# Patient Record
Sex: MALE | Race: BLACK OR AFRICAN AMERICAN | NOT HISPANIC OR LATINO | URBAN - METROPOLITAN AREA
[De-identification: names, ages, dates, MRNs, and addresses within clinical notes are randomized per-mention and may not be internally consistent; named-entity substitution may affect disease eponyms.]

---

## 2017-12-22 ENCOUNTER — EMERGENCY (EMERGENCY)
Facility: HOSPITAL | Age: 42
LOS: 1 days | Discharge: HOME | End: 2017-12-22
Attending: EMERGENCY MEDICINE | Admitting: EMERGENCY MEDICINE
Payer: SELF-PAY

## 2017-12-22 VITALS
OXYGEN SATURATION: 94 % | DIASTOLIC BLOOD PRESSURE: 76 MMHG | SYSTOLIC BLOOD PRESSURE: 130 MMHG | HEART RATE: 101 BPM | TEMPERATURE: 99 F | RESPIRATION RATE: 18 BRPM

## 2017-12-22 VITALS
SYSTOLIC BLOOD PRESSURE: 110 MMHG | DIASTOLIC BLOOD PRESSURE: 80 MMHG | RESPIRATION RATE: 14 BRPM | OXYGEN SATURATION: 99 % | HEART RATE: 88 BPM

## 2017-12-22 DIAGNOSIS — R55 SYNCOPE AND COLLAPSE: ICD-10-CM

## 2017-12-22 LAB
ALBUMIN SERPL ELPH-MCNC: 3.9 G/DL — SIGNIFICANT CHANGE UP (ref 3.4–5)
ALP SERPL-CCNC: 97 U/L — SIGNIFICANT CHANGE UP (ref 40–120)
ALT FLD-CCNC: 34 U/L — SIGNIFICANT CHANGE UP (ref 12–42)
ANION GAP SERPL CALC-SCNC: 7 MMOL/L — LOW (ref 9–16)
APPEARANCE UR: CLEAR — SIGNIFICANT CHANGE UP
AST SERPL-CCNC: 24 U/L — SIGNIFICANT CHANGE UP (ref 15–37)
BILIRUB SERPL-MCNC: 0.1 MG/DL — LOW (ref 0.2–1.2)
BILIRUB UR-MCNC: NEGATIVE — SIGNIFICANT CHANGE UP
BUN SERPL-MCNC: 23 MG/DL — SIGNIFICANT CHANGE UP (ref 7–23)
CALCIUM SERPL-MCNC: 8.9 MG/DL — SIGNIFICANT CHANGE UP (ref 8.5–10.5)
CHLORIDE SERPL-SCNC: 104 MMOL/L — SIGNIFICANT CHANGE UP (ref 96–108)
CO2 SERPL-SCNC: 28 MMOL/L — SIGNIFICANT CHANGE UP (ref 22–31)
COLOR SPEC: YELLOW — SIGNIFICANT CHANGE UP
CREAT SERPL-MCNC: 1.67 MG/DL — HIGH (ref 0.5–1.3)
DIFF PNL FLD: NEGATIVE — SIGNIFICANT CHANGE UP
GLUCOSE SERPL-MCNC: 166 MG/DL — HIGH (ref 70–99)
GLUCOSE UR QL: NEGATIVE — SIGNIFICANT CHANGE UP
HCT VFR BLD CALC: 42.2 % — SIGNIFICANT CHANGE UP (ref 39–50)
HGB BLD-MCNC: 14.3 G/DL — SIGNIFICANT CHANGE UP (ref 13–17)
KETONES UR-MCNC: (no result) MG/DL
LEUKOCYTE ESTERASE UR-ACNC: NEGATIVE — SIGNIFICANT CHANGE UP
MAGNESIUM SERPL-MCNC: 1.8 MG/DL — SIGNIFICANT CHANGE UP (ref 1.6–2.6)
MCHC RBC-ENTMCNC: 31.4 PG — SIGNIFICANT CHANGE UP (ref 27–34)
MCHC RBC-ENTMCNC: 33.9 G/DL — SIGNIFICANT CHANGE UP (ref 32–36)
MCV RBC AUTO: 92.5 FL — SIGNIFICANT CHANGE UP (ref 80–100)
NITRITE UR-MCNC: NEGATIVE — SIGNIFICANT CHANGE UP
PH UR: 6 — SIGNIFICANT CHANGE UP (ref 5–8)
PLATELET # BLD AUTO: 250 K/UL — SIGNIFICANT CHANGE UP (ref 150–400)
POTASSIUM SERPL-MCNC: 3.8 MMOL/L — SIGNIFICANT CHANGE UP (ref 3.5–5.3)
POTASSIUM SERPL-SCNC: 3.8 MMOL/L — SIGNIFICANT CHANGE UP (ref 3.5–5.3)
PROT SERPL-MCNC: 7.5 G/DL — SIGNIFICANT CHANGE UP (ref 6.4–8.2)
PROT UR-MCNC: NEGATIVE MG/DL — SIGNIFICANT CHANGE UP
RBC # BLD: 4.56 M/UL — SIGNIFICANT CHANGE UP (ref 4.2–5.8)
RBC # FLD: 12 % — SIGNIFICANT CHANGE UP (ref 10.3–16.9)
SODIUM SERPL-SCNC: 139 MMOL/L — SIGNIFICANT CHANGE UP (ref 132–145)
SP GR SPEC: >=1.03 — SIGNIFICANT CHANGE UP (ref 1–1.03)
UROBILINOGEN FLD QL: 0.2 E.U./DL — SIGNIFICANT CHANGE UP
WBC # BLD: 6.4 K/UL — SIGNIFICANT CHANGE UP (ref 3.8–10.5)
WBC # FLD AUTO: 6.4 K/UL — SIGNIFICANT CHANGE UP (ref 3.8–10.5)

## 2017-12-22 PROCEDURE — 99284 EMERGENCY DEPT VISIT MOD MDM: CPT | Mod: 25

## 2017-12-22 PROCEDURE — 93010 ELECTROCARDIOGRAM REPORT: CPT

## 2017-12-22 RX ORDER — SODIUM CHLORIDE 9 MG/ML
1000 INJECTION INTRAMUSCULAR; INTRAVENOUS; SUBCUTANEOUS
Qty: 0 | Refills: 0 | Status: DISCONTINUED | OUTPATIENT
Start: 2017-12-22 | End: 2017-12-26

## 2017-12-22 RX ADMIN — SODIUM CHLORIDE 200 MILLILITER(S): 9 INJECTION INTRAMUSCULAR; INTRAVENOUS; SUBCUTANEOUS at 20:37

## 2017-12-22 NOTE — ED PROVIDER NOTE - MEDICAL DECISION MAKING DETAILS
syncope vs unlikely seizure.  Patient without sig PMH, denies toxic habits.  Denies premonitory symptoms prior to incident - states "I felt fine today".  No VS derangements.  NO signs of head or neck trauma.  No loud murmurs, non focal neurological exam.  EKG, labs, fluids.

## 2017-12-22 NOTE — ED PROVIDER NOTE - NEUROLOGICAL, MLM
Alert and oriented, no focal deficits, no motor or sensory deficits.  clear and coherent speech, normal cranial nerve exam, normal finger to nose and SHANI.  Steady gait.  No pronator drift.

## 2017-12-22 NOTE — ED ADULT NURSE REASSESSMENT NOTE - NS ED NURSE REASSESS COMMENT FT1
assumed care of patient from RN [place]; pt here with seizure vs syncope episode; fludis running; iv patent; will continue to monitor

## 2017-12-22 NOTE — ED PROVIDER NOTE - OBJECTIVE STATEMENT
P Patient states I passed out.  As per PCR - patient found laying on the ground.  GF states that he passed out and was shaking for about 30 seconds.  AAO x 3, EMS states that he was postictal.  Transported without incident.  Patient denies premonitory symptoms.  Traveled from [place] by bus today.  No PMH, denies toxic habits.  GF is not at the bedside a this time.  seen walking around the ED prior to evaluation.

## 2017-12-22 NOTE — ED PROVIDER NOTE - PROGRESS NOTE DETAILS
GF now at the bedside.  Does not describe seizure like activity.  Shaking likely from monoclonic jerks.  EKG nsr.  Elevated creatinine.  discussed in detail with patient.  Lives in [place] and will follow up with his PMD although he has not sought medical care in years.  Unchanged neurological exam.  Conservative management discussed with the patient in detail.  Close PMD follow up encouraged.  Strict ED return instructions discussed in detail and patient given the opportunity to ask any questions about their discharge diagnosis and instructions

## 2017-12-22 NOTE — ED ADULT TRIAGE NOTE - CHIEF COMPLAINT QUOTE
new onset seizure 20 minutes ago while out to eat with his girlfriend. pt denies hx of seizure. as per GF seizure lasted a minute. denies drugs. awake alert oriented x 3 with headache

## 2017-12-22 NOTE — ED ADULT NURSE NOTE - OBJECTIVE STATEMENT
chronic shoulder pain. patient awake and ambulating independently. witness states that patient collapsed and then began to twitch. pt states that he does not recall the event. resting in bed, no signs of distress. family at bedside and supportive of care.

## 2018-12-21 ENCOUNTER — HOSPITAL ENCOUNTER (EMERGENCY)
Facility: HOSPITAL | Age: 43
Discharge: HOME | End: 2018-12-21
Attending: EMERGENCY MEDICINE | Admitting: EMERGENCY MEDICINE

## 2018-12-21 ENCOUNTER — APPOINTMENT (EMERGENCY)
Dept: RADIOLOGY | Facility: HOSPITAL | Age: 43
End: 2018-12-21
Attending: EMERGENCY MEDICINE

## 2018-12-21 VITALS
BODY MASS INDEX: 35.92 KG/M2 | OXYGEN SATURATION: 99 % | SYSTOLIC BLOOD PRESSURE: 100 MMHG | RESPIRATION RATE: 16 BRPM | HEIGHT: 68 IN | DIASTOLIC BLOOD PRESSURE: 60 MMHG | HEART RATE: 90 BPM | WEIGHT: 237 LBS | TEMPERATURE: 98 F

## 2018-12-21 DIAGNOSIS — H60.12 CELLULITIS OF AURICLE OF LEFT EAR: ICD-10-CM

## 2018-12-21 DIAGNOSIS — H60.312 ACUTE DIFFUSE OTITIS EXTERNA OF LEFT EAR: Primary | ICD-10-CM

## 2018-12-21 LAB
ANION GAP SERPL CALC-SCNC: 8 MEQ/L (ref 3–15)
BASOPHILS # BLD: 0.03 K/UL (ref 0.01–0.1)
BASOPHILS NFR BLD: 0.4 %
BUN SERPL-MCNC: 20 MG/DL (ref 8–20)
CALCIUM SERPL-MCNC: 9 MG/DL (ref 8.9–10.3)
CHLORIDE SERPL-SCNC: 103 MEQ/L (ref 98–109)
CO2 SERPL-SCNC: 26 MEQ/L (ref 22–32)
CREAT SERPL-MCNC: 1.2 MG/DL
DIFFERENTIAL METHOD BLD: ABNORMAL
EOSINOPHIL # BLD: 0.12 K/UL (ref 0.04–0.54)
EOSINOPHIL NFR BLD: 1.7 %
ERYTHROCYTE [DISTWIDTH] IN BLOOD BY AUTOMATED COUNT: 12.5 % (ref 11.6–14.4)
GFR SERPL CREATININE-BSD FRML MDRD: >60 ML/MIN/1.73M*2
GLUCOSE SERPL-MCNC: 112 MG/DL (ref 70–99)
HCT VFR BLDCO AUTO: 45.9 %
HGB BLD-MCNC: 15.5 G/DL
IMM GRANULOCYTES # BLD AUTO: 0.02 K/UL (ref 0–0.08)
IMM GRANULOCYTES NFR BLD AUTO: 0.3 %
LYMPHOCYTES # BLD: 2.11 K/UL (ref 1.2–3.5)
LYMPHOCYTES NFR BLD: 29.7 %
MCH RBC QN AUTO: 30.9 PG (ref 28–33.2)
MCHC RBC AUTO-ENTMCNC: 33.8 G/DL (ref 32.2–36.5)
MCV RBC AUTO: 91.6 FL (ref 83–98)
MONOCYTES # BLD: 0.49 K/UL (ref 0.3–1)
MONOCYTES NFR BLD: 6.9 %
NEUTROPHILS # BLD: 4.34 K/UL (ref 1.7–7)
NEUTS SEG NFR BLD: 61 %
NRBC BLD-RTO: 0.1 %
PDW BLD AUTO: 9.2 FL (ref 9.4–12.4)
PLATELET # BLD AUTO: 274 K/UL
POTASSIUM SERPL-SCNC: 3.5 MEQ/L (ref 3.6–5.1)
RBC # BLD AUTO: 5.01 M/UL (ref 4.5–5.8)
SODIUM SERPL-SCNC: 137 MEQ/L (ref 136–144)
WBC # BLD AUTO: 7.11 K/UL

## 2018-12-21 PROCEDURE — 70481 CT ORBIT/EAR/FOSSA W/DYE: CPT

## 2018-12-21 PROCEDURE — 63700000 HC SELF-ADMINISTRABLE DRUG: Performed by: EMERGENCY MEDICINE

## 2018-12-21 PROCEDURE — 36415 COLL VENOUS BLD VENIPUNCTURE: CPT | Performed by: EMERGENCY MEDICINE

## 2018-12-21 PROCEDURE — 85025 COMPLETE CBC W/AUTO DIFF WBC: CPT | Performed by: EMERGENCY MEDICINE

## 2018-12-21 PROCEDURE — 84295 ASSAY OF SERUM SODIUM: CPT | Performed by: EMERGENCY MEDICINE

## 2018-12-21 PROCEDURE — 25800000 HC PHARMACY IV SOLUTIONS: Performed by: EMERGENCY MEDICINE

## 2018-12-21 PROCEDURE — 63600105 HC IODINE BASED CONTRAST: Performed by: EMERGENCY MEDICINE

## 2018-12-21 PROCEDURE — 99284 EMERGENCY DEPT VISIT MOD MDM: CPT | Mod: 25

## 2018-12-21 RX ORDER — OFLOXACIN 3 MG/ML
10 SOLUTION AURICULAR (OTIC) DAILY
Qty: 5 ML | Refills: 0 | Status: SHIPPED | OUTPATIENT
Start: 2018-12-21 | End: 2018-12-28

## 2018-12-21 RX ORDER — CIPROFLOXACIN 500 MG/1
500 TABLET ORAL 2 TIMES DAILY
Qty: 14 TABLET | Refills: 0 | Status: SHIPPED | OUTPATIENT
Start: 2018-12-21 | End: 2018-12-28

## 2018-12-21 RX ORDER — POTASSIUM CHLORIDE 750 MG/1
40 TABLET, FILM COATED, EXTENDED RELEASE ORAL ONCE
Status: COMPLETED | OUTPATIENT
Start: 2018-12-21 | End: 2018-12-21

## 2018-12-21 RX ADMIN — SODIUM CHLORIDE 1000 ML: 9 INJECTION, SOLUTION INTRAVENOUS at 15:50

## 2018-12-21 RX ADMIN — IOHEXOL 100 ML: 300 INJECTION, SOLUTION INTRAVENOUS at 17:53

## 2018-12-21 RX ADMIN — POTASSIUM CHLORIDE 40 MEQ: 750 TABLET, FILM COATED, EXTENDED RELEASE ORAL at 19:20

## 2018-12-21 ASSESSMENT — ENCOUNTER SYMPTOMS
NECK PAIN: 0
RHINORRHEA: 0
VOMITING: 0
CHILLS: 0
TROUBLE SWALLOWING: 0
FEVER: 0
HEADACHES: 0

## 2018-12-21 NOTE — ED ATTESTATION NOTE
Agree with Pas findings and plan.I evaluated and performed a history and physical examination of the patient.43 man presents with swelling and discomfort in his left ear.  He thinks perhaps it started with a bug bite.  He says his hearing is slightly reduced but denies fever or chills.  He is not diabetic and has no other medical problems exam: Alert man pleasant cooperative examination left ear shows diffuse swelling of the pinna is slightly tender to palpation he has minimal fluid coming out of the left ear canal.  No cellulitis or skin reaction over the mastoid process but he is definitely tender there.     Seferino Mcnair MD  12/21/18 1823

## 2018-12-21 NOTE — ED PROVIDER NOTES
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  denies injury  Relieved by:  Nothing  Worsened by:  Nothing  Ineffective treatments:  None tried  Associated symptoms: no congestion, no ear discharge, no fever, no headaches, no hearing loss, no neck pain, no rash, no rhinorrhea, no tinnitus and no vomiting    Risk factors: no chronic ear infection         Patient History     History reviewed. No pertinent past medical history.    History reviewed. No pertinent surgical history.    History reviewed. No pertinent family history.    Social History   Substance Use Topics   • Smoking status: Never Smoker   • Smokeless tobacco: Never Used   • Alcohol use No       Systems Reviewed from Nursing Triage:  Tobacco  Allergies  Meds  Problems  Med Hx  Surg Hx  Soc Hx         Review of Systems     Review of Systems   Constitutional: Negative for chills and fever.   HENT: Positive for ear pain. Negative for congestion, ear discharge, hearing loss, rhinorrhea, tinnitus and trouble swallowing.    Gastrointestinal: Negative for vomiting.   Musculoskeletal: Negative for neck pain.   Skin: Negative for rash.   Neurological: Negative for headaches.        Physical Exam     ED Triage Vitals [12/21/18 1312]   Temp Heart Rate Resp BP SpO2   36.7 °C (98 °F) 90 16 100/60 99 %      Temp Source Heart Rate Source Patient Position BP Location FiO2 (%) (Set)   Tympanic -- -- -- --                     Patient Vitals for the past 24 hrs:   BP Temp Temp src  "Pulse Resp SpO2 Height Weight   12/21/18 1312 100/60 36.7 °C (98 °F) Tympanic 90 16 99 % 1.727 m (5' 8\") 108 kg (237 lb)           Physical Exam   Constitutional: He appears well-developed and well-nourished.   HENT:   Right Ear: Hearing, tympanic membrane, external ear and ear canal normal.   Left Ear: Hearing normal. There is swelling (mild erythema and swelling of the external canal) and tenderness. There is mastoid tenderness. Tympanic membrane is not erythematous, not retracted and not bulging.   Ears:    Eyes: Conjunctivae and EOM are normal. Pupils are equal, round, and reactive to light.   Neck: Normal range of motion.   Lymphadenopathy:     He has cervical adenopathy (mild left anterior).   Nursing note and vitals reviewed.           Procedures    ED Course & MDM     Labs Reviewed   BASIC METABOLIC PANEL - Abnormal        Result Value    Sodium 137      Potassium 3.5 (*)     Chloride 103      CO2 26      BUN 20      Creatinine 1.2      Glucose 112 (*)     Calcium 9.0      eGFR >60.0      Anion Gap 8     CBC - Abnormal     WBC 7.11      RBC 5.01      Hemoglobin 15.5      Hematocrit 45.9      MCV 91.6      MCH 30.9      MCHC 33.8      RDW 12.5      Platelets 274      MPV 9.2 (*)    DIFF COUNT - Abnormal     Differential Type Auto      nRBC 0.1 (*)     Immature Granulocytes 0.3      Neutrophils 61.0      Lymphocytes 29.7      Monocytes 6.9      Eosinophils 1.7      Basophils 0.4      Immature Granulocytes, Absolute 0.02      Neutrophils, Absolute 4.34      Lymphocytes, Absolute 2.11      Monocytes, Absolute 0.49      Eosinophils, Absolute 0.12      Basophils, Absolute 0.03     CBC AND DIFFERENTIAL    Narrative:     The following orders were created for panel order CBC and differential.  Procedure                               Abnormality         Status                     ---------                               -----------         ------                     CBC[[id number]]                           Abnormal      "       Final result               Diff Count[[id number]]                    Abnormal            Final result                 Please view results for these tests on the individual orders.       CT ORBITS AND SELLA WITH IV CONTRAST   Final Result   IMPRESSION:      Left otitis externa involving the cartilaginous external auditory canal and left   pinna.               CT SOFT TISSUE NECK WITH IV CONTRAST    (Results Pending)               [facility]         ED Course as of Dec 21 2126   Fri Dec 21, 2018   1843 Call placed to ent to discuss treatment and follow up  [RS]   1920 2nd call to ENT  [RS]   1922 Spoke with dr lind agrees with flouroquinolone ear drops and oral for otitis externa.  Will see in office on Monday have pt call 9 am and they will fit him in  [RS]      ED Course User Index  [RS] Dirk Alberto PA C         Clinical Impressions as of Dec 21 2126   Acute diffuse otitis externa of left ear   Cellulitis of auricle of left ear        Dirk Alberto PA C  12/21/18 2126

## 2019-02-15 ENCOUNTER — HOSPITAL ENCOUNTER (EMERGENCY)
Facility: HOSPITAL | Age: 44
Discharge: HOME | End: 2019-02-15
Attending: EMERGENCY MEDICINE

## 2019-02-15 VITALS
WEIGHT: 237 LBS | BODY MASS INDEX: 35.92 KG/M2 | TEMPERATURE: 97.2 F | HEIGHT: 68 IN | HEART RATE: 78 BPM | DIASTOLIC BLOOD PRESSURE: 71 MMHG | SYSTOLIC BLOOD PRESSURE: 124 MMHG | RESPIRATION RATE: 16 BRPM | OXYGEN SATURATION: 99 %

## 2019-02-15 DIAGNOSIS — H60.12 CELLULITIS OF LEFT EXTERNAL EAR: Primary | ICD-10-CM

## 2019-02-15 PROCEDURE — 99282 EMERGENCY DEPT VISIT SF MDM: CPT

## 2019-02-15 RX ORDER — CEPHALEXIN 500 MG/1
500 CAPSULE ORAL 4 TIMES DAILY
Qty: 28 CAPSULE | Refills: 0 | Status: SHIPPED | OUTPATIENT
Start: 2019-02-15 | End: 2019-02-22

## 2019-02-15 ASSESSMENT — ENCOUNTER SYMPTOMS: FEVER: 0

## 2019-02-15 NOTE — ED PROVIDER NOTES
"HPI     Chief Complaint   Patient presents with   • Earache / Otalgia       Patient is a pleasant 43-year-old male with no past medical history presents the emergency department for evaluation of a swelling to the outer portion of the left ear.  Patient states his symptoms are similar to when he was seen here about 2 months ago and diagnosed with an ear infection.  Symptoms just began yesterday evening.  No known trauma.  No significant pain.  No change in hearing.  No fever, URI symptoms.  Patient states that when he was seen previously, he took about a week to get the antibiotics filled, but symptoms cleared up with the antibiotics.  He was not able to get the eardrops filled because of the cost of the medication.  He did not follow-up with ENT.             Patient History     History reviewed. No pertinent past medical history.    History reviewed. No pertinent surgical history.    History reviewed. No pertinent family history.    Social History   Substance Use Topics   • Smoking status: Never Smoker   • Smokeless tobacco: Never Used   • Alcohol use Yes      Comment: occasional       Systems Reviewed from Nursing Triage:          Review of Systems     Review of Systems   Constitutional: Negative for fever.   HENT: Negative for ear discharge.    All other systems reviewed and are negative.       Physical Exam     ED Triage Vitals [02/15/19 0825]   Temp Heart Rate Resp BP SpO2   36.2 °C (97.2 °F) 78 16 124/71 99 %      Temp src Heart Rate Source Patient Position BP Location FiO2 (%) (Set)   -- -- -- -- --                     Patient Vitals for the past 24 hrs:   BP Temp Pulse Resp SpO2 Height Weight   02/15/19 0825 124/71 36.2 °C (97.2 °F) 78 16 99 % 1.727 m (5' 8\") 108 kg (237 lb)           Physical Exam   Constitutional: He appears well-developed and well-nourished.   HENT:   Head: Normocephalic and atraumatic.   Right Ear: Tympanic membrane and ear canal normal.   Left Ear: Tympanic membrane and ear canal " normal.   Ears:    Cardiovascular: Normal rate and regular rhythm.    Pulmonary/Chest: Effort normal and breath sounds normal.   Neurological: He is alert.            Procedures    ED Course & MDM     Labs Reviewed - No data to display    No orders to display               MDM  Number of Diagnoses or Management Options  Cellulitis of left external ear:   Diagnosis management comments: Patient is a 43-year-old male who presents the emergency department for evaluation of redness of the L ear.  On exam, patient is overall very well-appearing.  TMs are normal, no signs of otitis media or otitis externa on exam.  He has no tenderness over the mastoid.  No history of trauma. Suspect cellulitis of the external ear.  Patient will be started on a course of oral antibiotics.  He is encouraged to follow-up with ENT or return to the emergency department if symptoms are not improving           Clinical Impressions as of Feb 15 0837   Cellulitis of left external ear        Alis Fairchild MD  02/15/19 0832

## 2019-02-15 NOTE — DISCHARGE INSTRUCTIONS
Return to the ED for worsening of symptoms or any problems or concerns.  It is very important to follow up with your healthcare provider for re-evaluation.

## 2019-08-27 NOTE — ED PROVIDER NOTE - NS ED MD DISPO DISCHARGE CCDA
normal/supple/no JVD/normal thyroid gland Patient/Caregiver provided printed discharge information. normal thyroid gland/supple/no JVD

## 2020-06-30 ENCOUNTER — APPOINTMENT (EMERGENCY)
Dept: RADIOLOGY | Facility: HOSPITAL | Age: 45
DRG: 312 | End: 2020-06-30
Attending: EMERGENCY MEDICINE
Payer: COMMERCIAL

## 2020-06-30 ENCOUNTER — HOSPITAL ENCOUNTER (INPATIENT)
Facility: HOSPITAL | Age: 45
LOS: 4 days | Discharge: HOME | DRG: 312 | End: 2020-07-04
Attending: EMERGENCY MEDICINE | Admitting: HOSPITALIST
Payer: COMMERCIAL

## 2020-06-30 ENCOUNTER — APPOINTMENT (EMERGENCY)
Dept: RADIOLOGY | Facility: HOSPITAL | Age: 45
DRG: 312 | End: 2020-06-30
Attending: SURGERY
Payer: COMMERCIAL

## 2020-06-30 ENCOUNTER — APPOINTMENT (EMERGENCY)
Dept: RADIOLOGY | Facility: HOSPITAL | Age: 45
DRG: 312 | End: 2020-06-30
Attending: SURGERY

## 2020-06-30 DIAGNOSIS — S09.90XA TRAUMATIC INJURY OF HEAD, INITIAL ENCOUNTER: ICD-10-CM

## 2020-06-30 DIAGNOSIS — N17.9 ACUTE RENAL FAILURE, UNSPECIFIED ACUTE RENAL FAILURE TYPE (CMS/HCC): Primary | ICD-10-CM

## 2020-06-30 DIAGNOSIS — F19.10 POLYSUBSTANCE ABUSE (CMS/HCC): ICD-10-CM

## 2020-06-30 DIAGNOSIS — R65.10 SIRS (SYSTEMIC INFLAMMATORY RESPONSE SYNDROME) (CMS/HCC): ICD-10-CM

## 2020-06-30 DIAGNOSIS — E87.20 LACTIC ACIDOSIS: ICD-10-CM

## 2020-06-30 PROBLEM — R41.82 ALTERED MENTAL STATUS: Status: ACTIVE | Noted: 2020-06-30

## 2020-06-30 PROBLEM — R40.20 LOSS OF CONSCIOUSNESS (CMS/HCC): Status: ACTIVE | Noted: 2020-06-30

## 2020-06-30 LAB
ABO + RH BLD: NORMAL
ALBUMIN SERPL-MCNC: 4.4 G/DL (ref 3.4–5)
ALP SERPL-CCNC: 75 IU/L (ref 35–126)
ALT SERPL-CCNC: 34 IU/L (ref 16–63)
AMPHET UR QL SCN: POSITIVE
ANION GAP SERPL CALC-SCNC: 12 MEQ/L (ref 3–15)
ANION GAP SERPL CALC-SCNC: 32 MEQ/L (ref 3–15)
APAP SERPL-MCNC: <10 UG/ML (ref 10–30)
APTT PPP: 25 SEC (ref 23–35)
AST SERPL-CCNC: 41 IU/L (ref 15–41)
BACTERIA URNS QL MICRO: ABNORMAL /HPF
BARBITURATES UR QL SCN: NOT DETECTED
BASE EXCESS BLDA CALC-SCNC: -7.9 MEQ/L
BASOPHILS # BLD: 0 K/UL (ref 0.01–0.1)
BASOPHILS # BLD: 0.02 K/UL (ref 0.01–0.1)
BASOPHILS NFR BLD: 0 %
BASOPHILS NFR BLD: 0.1 %
BENZODIAZ UR QL SCN: NOT DETECTED
BILIRUB SERPL-MCNC: 0.6 MG/DL (ref 0.3–1.2)
BILIRUB UR QL STRIP.AUTO: NEGATIVE MG/DL
BLD GP AB SCN SERPL QL: NEGATIVE
BUN SERPL-MCNC: 15 MG/DL (ref 8–20)
BUN SERPL-MCNC: 16 MG/DL (ref 8–20)
CALCIUM SERPL-MCNC: 8.7 MG/DL (ref 8.9–10.3)
CALCIUM SERPL-MCNC: 9.8 MG/DL (ref 8.9–10.3)
CANNABINOIDS UR QL SCN: POSITIVE
CHLORIDE SERPL-SCNC: 104 MEQ/L (ref 98–109)
CHLORIDE SERPL-SCNC: 110 MEQ/L (ref 98–109)
CK SERPL-CCNC: 632 U/L (ref 16–300)
CLARITY UR REFRACT.AUTO: CLEAR
CO2 SERPL-SCNC: 18 MEQ/L (ref 22–32)
CO2 SERPL-SCNC: 7 MEQ/L (ref 22–32)
COCAINE UR QL SCN: NOT DETECTED
COLOR UR AUTO: YELLOW
CREAT SERPL-MCNC: 1.7 MG/DL (ref 0.8–1.3)
CREAT SERPL-MCNC: 2 MG/DL (ref 0.8–1.3)
D AG BLD QL: POSITIVE
DIFFERENTIAL METHOD BLD: ABNORMAL
DIFFERENTIAL METHOD BLD: ABNORMAL
EOSINOPHIL # BLD: 0.03 K/UL (ref 0.04–0.54)
EOSINOPHIL # BLD: 0.26 K/UL (ref 0.04–0.54)
EOSINOPHIL NFR BLD: 0.2 %
EOSINOPHIL NFR BLD: 2 %
ERYTHROCYTE [DISTWIDTH] IN BLOOD BY AUTOMATED COUNT: 12.7 % (ref 11.6–14.4)
ERYTHROCYTE [DISTWIDTH] IN BLOOD BY AUTOMATED COUNT: 12.9 % (ref 11.6–14.4)
ETHANOL SERPL-MCNC: <5 MG/DL
ETHANOL SERPL-MCNC: <5 MG/DL
FIO2 ON VENT: ABNORMAL %
GFR SERPL CREATININE-BSD FRML MDRD: 44.2 ML/MIN/1.73M*2
GFR SERPL CREATININE-BSD FRML MDRD: 53.3 ML/MIN/1.73M*2
GLUCOSE BLD-MCNC: 153 MG/DL (ref 70–99)
GLUCOSE SERPL-MCNC: 123 MG/DL (ref 70–99)
GLUCOSE SERPL-MCNC: 158 MG/DL (ref 70–99)
GLUCOSE UR STRIP.AUTO-MCNC: NEGATIVE MG/DL
HCO3 BLDA-SCNC: 17.7 MEQ/L (ref 21–28)
HCT VFR BLDCO AUTO: 42.1 % (ref 40.1–51)
HCT VFR BLDCO AUTO: 55.1 % (ref 40.1–51)
HGB BLD-MCNC: 13.9 G/DL (ref 13.7–17.5)
HGB BLD-MCNC: 17.2 G/DL (ref 13.7–17.5)
HGB UR QL STRIP.AUTO: NEGATIVE
HYALINE CASTS #/AREA URNS LPF: ABNORMAL /LPF
IMM GRANULOCYTES # BLD AUTO: 0.05 K/UL (ref 0–0.08)
IMM GRANULOCYTES NFR BLD AUTO: 0.4 %
INHALED O2 CONCENTRATION: ABNORMAL %
INR PPP: 1 INR
KETONES UR STRIP.AUTO-MCNC: NEGATIVE MG/DL
LABORATORY COMMENT REPORT: NORMAL
LACTATE SERPL-SCNC: 1.3 MMOL/L (ref 0.4–2)
LACTATE SERPL-SCNC: 4.7 MMOL/L (ref 0.4–2)
LACTATE SERPL-SCNC: >13 MMOL/L (ref 0.4–2)
LEUKOCYTE ESTERASE UR QL STRIP.AUTO: NEGATIVE
LYMPHOCYTES # BLD: 1.61 K/UL (ref 1.2–3.5)
LYMPHOCYTES # BLD: 5.71 K/UL (ref 1.2–3.5)
LYMPHOCYTES NFR BLD: 11.8 %
LYMPHOCYTES NFR BLD: 44 %
MACROCYTES BLD QL SMEAR: ABNORMAL
MCH RBC QN AUTO: 30.4 PG (ref 28–33.2)
MCH RBC QN AUTO: 31 PG (ref 28–33.2)
MCHC RBC AUTO-ENTMCNC: 31.2 G/DL (ref 32.2–36.5)
MCHC RBC AUTO-ENTMCNC: 33 G/DL (ref 32.2–36.5)
MCV RBC AUTO: 92.1 FL (ref 83–98)
MCV RBC AUTO: 99.3 FL (ref 83–98)
MONOCYTES # BLD: 0.85 K/UL (ref 0.3–1)
MONOCYTES # BLD: 0.91 K/UL (ref 0.3–1)
MONOCYTES NFR BLD: 6.2 %
MONOCYTES NFR BLD: 7 %
NEUTROPHILS # BLD: 11.05 K/UL (ref 1.7–7)
NEUTS BAND # BLD: 5.84 K/UL (ref 1.7–7)
NEUTS SEG NFR BLD: 45 %
NEUTS SEG NFR BLD: 81.3 %
NITRITE UR QL STRIP.AUTO: NEGATIVE
NRBC BLD-RTO: 0 %
OPIATES UR QL SCN: NOT DETECTED
OSMOLALITY SERPL: 295 MOSM/KG (ref 280–300)
PCO2 BLDA: 36 MM HG (ref 35–48)
PCP UR QL SCN: NOT DETECTED
PDW BLD AUTO: 8.9 FL (ref 9.4–12.4)
PDW BLD AUTO: 9.6 FL (ref 9.4–12.4)
PH BLDA: 7.3 [PH] (ref 7.35–7.45)
PH UR STRIP.AUTO: 6 [PH]
PLAT MORPH BLD: NORMAL
PLATELET # BLD AUTO: 238 K/UL (ref 150–350)
PLATELET # BLD AUTO: 325 K/UL (ref 150–350)
PLATELET # BLD EST: ABNORMAL 10*3/UL
PO2 BLDA: 74 MM HG (ref 83–100)
POCT TEST: ABNORMAL
POIKILOCYTOSIS BLD QL SMEAR: ABNORMAL
POTASSIUM SERPL-SCNC: 3.8 MEQ/L (ref 3.6–5.1)
POTASSIUM SERPL-SCNC: 4.2 MEQ/L (ref 3.6–5.1)
PROT SERPL-MCNC: 7.6 G/DL (ref 6–8.2)
PROT UR QL STRIP.AUTO: 1
PROTHROMBIN TIME: 13.4 SEC (ref 12.2–14.5)
RBC # BLD AUTO: 4.57 M/UL (ref 4.5–5.8)
RBC # BLD AUTO: 5.55 M/UL (ref 4.5–5.8)
RBC #/AREA URNS HPF: ABNORMAL /HPF
SALICYLATES SERPL-MCNC: <4 MG/DL
SARS-COV-2 RNA RESP QL NAA+PROBE: NEGATIVE
SODIUM SERPL-SCNC: 140 MEQ/L (ref 136–144)
SODIUM SERPL-SCNC: 143 MEQ/L (ref 136–144)
SP GR UR REFRACT.AUTO: >1.035
SQUAMOUS URNS QL MICRO: 1 /HPF
TROPONIN I SERPL-MCNC: <0.03 NG/ML
UROBILINOGEN UR STRIP-ACNC: 0.2 EU/DL
VARIANT LYMPHS NFR BLD: 2 %
WBC # BLD AUTO: 12.97 K/UL (ref 3.8–10.5)
WBC # BLD AUTO: 13.61 K/UL (ref 3.8–10.5)
WBC #/AREA URNS HPF: ABNORMAL /HPF

## 2020-06-30 PROCEDURE — 93005 ELECTROCARDIOGRAM TRACING: CPT | Performed by: HOSPITALIST

## 2020-06-30 PROCEDURE — 68200000 HC TRAUMA RSPNS W/O CRIT CARE

## 2020-06-30 PROCEDURE — 84484 ASSAY OF TROPONIN QUANT: CPT | Performed by: EMERGENCY MEDICINE

## 2020-06-30 PROCEDURE — 82550 ASSAY OF CK (CPK): CPT | Performed by: HOSPITALIST

## 2020-06-30 PROCEDURE — 93005 ELECTROCARDIOGRAM TRACING: CPT | Performed by: EMERGENCY MEDICINE

## 2020-06-30 PROCEDURE — 74177 CT ABD & PELVIS W/CONTRAST: CPT

## 2020-06-30 PROCEDURE — 80307 DRUG TEST PRSMV CHEM ANLYZR: CPT | Performed by: EMERGENCY MEDICINE

## 2020-06-30 PROCEDURE — 83605 ASSAY OF LACTIC ACID: CPT | Performed by: EMERGENCY MEDICINE

## 2020-06-30 PROCEDURE — 85025 COMPLETE CBC W/AUTO DIFF WBC: CPT | Performed by: EMERGENCY MEDICINE

## 2020-06-30 PROCEDURE — 21400000 HC ROOM AND CARE CCU/INTERMEDIATE

## 2020-06-30 PROCEDURE — 90715 TDAP VACCINE 7 YRS/> IM: CPT

## 2020-06-30 PROCEDURE — G0480 DRUG TEST DEF 1-7 CLASSES: HCPCS | Performed by: SURGERY

## 2020-06-30 PROCEDURE — 90471 IMMUNIZATION ADMIN: CPT

## 2020-06-30 PROCEDURE — 80053 COMPREHEN METABOLIC PANEL: CPT | Performed by: EMERGENCY MEDICINE

## 2020-06-30 PROCEDURE — 72125 CT NECK SPINE W/O DYE: CPT

## 2020-06-30 PROCEDURE — 63600000 HC DRUGS/DETAIL CODE: Performed by: HOSPITALIST

## 2020-06-30 PROCEDURE — 25800000 HC PHARMACY IV SOLUTIONS: Performed by: EMERGENCY MEDICINE

## 2020-06-30 PROCEDURE — 3E0234Z INTRODUCTION OF SERUM, TOXOID AND VACCINE INTO MUSCLE, PERCUTANEOUS APPROACH: ICD-10-PCS | Performed by: EMERGENCY MEDICINE

## 2020-06-30 PROCEDURE — 85730 THROMBOPLASTIN TIME PARTIAL: CPT | Performed by: EMERGENCY MEDICINE

## 2020-06-30 PROCEDURE — 85610 PROTHROMBIN TIME: CPT | Performed by: SURGERY

## 2020-06-30 PROCEDURE — 63600105 HC IODINE BASED CONTRAST: Mod: JW | Performed by: SURGERY

## 2020-06-30 PROCEDURE — 63600000 HC DRUGS/DETAIL CODE

## 2020-06-30 PROCEDURE — 99223 1ST HOSP IP/OBS HIGH 75: CPT | Performed by: HOSPITALIST

## 2020-06-30 PROCEDURE — 83930 ASSAY OF BLOOD OSMOLALITY: CPT | Performed by: EMERGENCY MEDICINE

## 2020-06-30 PROCEDURE — 85610 PROTHROMBIN TIME: CPT | Performed by: EMERGENCY MEDICINE

## 2020-06-30 PROCEDURE — 71260 CT THORAX DX C+: CPT

## 2020-06-30 PROCEDURE — 82693 ASSAY OF ETHYLENE GLYCOL: CPT | Performed by: EMERGENCY MEDICINE

## 2020-06-30 PROCEDURE — 86850 RBC ANTIBODY SCREEN: CPT

## 2020-06-30 PROCEDURE — 84600 ASSAY OF VOLATILES: CPT | Performed by: EMERGENCY MEDICINE

## 2020-06-30 PROCEDURE — 85025 COMPLETE CBC W/AUTO DIFF WBC: CPT | Performed by: SURGERY

## 2020-06-30 PROCEDURE — 71045 X-RAY EXAM CHEST 1 VIEW: CPT

## 2020-06-30 PROCEDURE — 82803 BLOOD GASES ANY COMBINATION: CPT | Performed by: NURSE PRACTITIONER

## 2020-06-30 PROCEDURE — 36415 COLL VENOUS BLD VENIPUNCTURE: CPT | Performed by: EMERGENCY MEDICINE

## 2020-06-30 PROCEDURE — U0002 COVID-19 LAB TEST NON-CDC: HCPCS | Performed by: EMERGENCY MEDICINE

## 2020-06-30 PROCEDURE — 85730 THROMBOPLASTIN TIME PARTIAL: CPT | Performed by: SURGERY

## 2020-06-30 PROCEDURE — 96360 HYDRATION IV INFUSION INIT: CPT | Mod: 59

## 2020-06-30 PROCEDURE — 99285 EMERGENCY DEPT VISIT HI MDM: CPT | Mod: 25

## 2020-06-30 PROCEDURE — G0480 DRUG TEST DEF 1-7 CLASSES: HCPCS | Performed by: EMERGENCY MEDICINE

## 2020-06-30 PROCEDURE — 81001 URINALYSIS AUTO W/SCOPE: CPT | Performed by: SURGERY

## 2020-06-30 PROCEDURE — 80048 BASIC METABOLIC PNL TOTAL CA: CPT | Performed by: EMERGENCY MEDICINE

## 2020-06-30 PROCEDURE — 96361 HYDRATE IV INFUSION ADD-ON: CPT

## 2020-06-30 PROCEDURE — 70450 CT HEAD/BRAIN W/O DYE: CPT

## 2020-06-30 RX ORDER — ACETAMINOPHEN 325 MG/1
650 TABLET ORAL EVERY 4 HOURS PRN
Status: DISCONTINUED | OUTPATIENT
Start: 2020-06-30 | End: 2020-07-04 | Stop reason: HOSPADM

## 2020-06-30 RX ORDER — SENNOSIDES 8.6 MG/1
1 TABLET ORAL 2 TIMES DAILY PRN
Status: DISCONTINUED | OUTPATIENT
Start: 2020-06-30 | End: 2020-07-04 | Stop reason: HOSPADM

## 2020-06-30 RX ORDER — ONDANSETRON HYDROCHLORIDE 2 MG/ML
INJECTION, SOLUTION INTRAVENOUS
Status: DISCONTINUED
Start: 2020-06-30 | End: 2020-06-30 | Stop reason: WASHOUT

## 2020-06-30 RX ORDER — DEXTROSE 50 % IN WATER (D50W) INTRAVENOUS SYRINGE
25 AS NEEDED
Status: DISCONTINUED | OUTPATIENT
Start: 2020-06-30 | End: 2020-07-04 | Stop reason: HOSPADM

## 2020-06-30 RX ORDER — IBUPROFEN 200 MG
16-32 TABLET ORAL AS NEEDED
Status: DISCONTINUED | OUTPATIENT
Start: 2020-06-30 | End: 2020-07-04 | Stop reason: HOSPADM

## 2020-06-30 RX ORDER — DEXTROSE 40 %
15-30 GEL (GRAM) ORAL AS NEEDED
Status: DISCONTINUED | OUTPATIENT
Start: 2020-06-30 | End: 2020-07-04 | Stop reason: HOSPADM

## 2020-06-30 RX ORDER — ALBUTEROL SULFATE 90 UG/1
2 INHALANT RESPIRATORY (INHALATION) EVERY 6 HOURS PRN
COMMUNITY
End: 2020-08-26 | Stop reason: SDUPTHER

## 2020-06-30 RX ADMIN — SODIUM CHLORIDE 1000 ML: 9 INJECTION, SOLUTION INTRAVENOUS at 16:02

## 2020-06-30 RX ADMIN — SODIUM CHLORIDE, POTASSIUM CHLORIDE, SODIUM LACTATE AND CALCIUM CHLORIDE 1000 ML: 600; 310; 30; 20 INJECTION, SOLUTION INTRAVENOUS at 22:51

## 2020-06-30 RX ADMIN — SODIUM CHLORIDE 1000 ML: 9 INJECTION, SOLUTION INTRAVENOUS at 16:01

## 2020-06-30 RX ADMIN — IOHEXOL 85 ML: 350 INJECTION, SOLUTION INTRAVENOUS at 16:36

## 2020-06-30 RX ADMIN — SODIUM CHLORIDE 1000 ML: 9 INJECTION, SOLUTION INTRAVENOUS at 17:48

## 2020-06-30 RX ADMIN — TETANUS TOXOID, REDUCED DIPHTHERIA TOXOID AND ACELLULAR PERTUSSIS VACCINE, ADSORBED 0.5 ML: 5; 2.5; 8; 8; 2.5 SUSPENSION INTRAMUSCULAR at 16:09

## 2020-06-30 ASSESSMENT — ENCOUNTER SYMPTOMS
FATIGUE: 0
HEADACHES: 1
DYSURIA: 0
TROUBLE SWALLOWING: 0
NAUSEA: 1
ARTHRALGIAS: 0
COUGH: 0
VOMITING: 0
FEVER: 0
ABDOMINAL PAIN: 0
DISORIENTATION: 1
LOSS OF CONSCIOUSNESS: 1
PALPITATIONS: 0
SORE THROAT: 0
SHORTNESS OF BREATH: 0
COLOR CHANGE: 0
BACK PAIN: 0
FLANK PAIN: 0

## 2020-06-30 ASSESSMENT — COGNITIVE AND FUNCTIONAL STATUS - GENERAL
DRESSING REGULAR UPPER BODY CLOTHING: 4 - NONE
DRESSING REGULAR LOWER BODY CLOTHING: 4 - NONE
TOILETING: 4 - NONE
HELP NEEDED FOR BATHING: 4 - NONE
MOVING TO AND FROM BED TO CHAIR: 4 - NONE
CLIMB 3 TO 5 STEPS WITH RAILING: 4 - NONE
EATING MEALS: 4 - NONE
STANDING UP FROM CHAIR USING ARMS: 4 - NONE
WALKING IN HOSPITAL ROOM: 4 - NONE
HELP NEEDED FOR PERSONAL GROOMING: 4 - NONE

## 2020-06-30 NOTE — ED NOTES
4:11pm    SW responded to a code 9 trauma for pt who presents with head trauma from falling on the back of his head. Pt was brought to the emergency room by car and became a trauma after LOC and tachycardic heart rate. Pt reports marijuana use denies alcohol use. Pt reports that he does not want SW to call any emergency contact at this time.  SW will continue to provide emotional support at this time.

## 2020-06-30 NOTE — CONSULTS
TRAUMA SURGERY CONSULT     PATIENT NAME:  Fernando Lyle YOB: 1975    AGE:  45 y.o.  GENDER: male   MRN:  [id number]  PATIENT #: [id number]     Chief Complaint   Patient presents with   • Altered Mental Status   Found down     SEE SEPARATE NOTE FOR CONSULT TIMES.    HISTORY OF PRESENT ILLNESS/INJURY     Mechanism of Injury: Found down    45 y.o. male with no significant past medical history was found unresponsive outside by a relative. He is amnestic to the events. Unclear LOC. He is a GCS 13 to 14 on arrival to the trauma bay as a Code 9. The patient had a transient episode of hypotension.     Relevant PMH: NONE     Pharmacological Risk Factors:   · Oral Anti-Coagulation: DENIES   · Antiplatelet Therapy: DENIES     Patient Vitals for the past 24 hrs:   BP Temp Temp src Pulse Resp SpO2   06/30/20 1800 116/62 -- -- 88 20 100 %   06/30/20 1740 (!) 108/57 -- -- 91 (!) 22 97 %   06/30/20 1710 (!) 102/56 -- -- 98 (!) 24 94 %   06/30/20 1700 (!) 101/51 -- -- (!) 101 (!) 27 93 %   06/30/20 1650 100/61 -- -- (!) 101 (!) 25 94 %   06/30/20 1630 (!) 99/51 -- -- (!) 108 (!) 40 92 %   06/30/20 1613 (!) 90/56 -- -- (!) 116 (!) 35 95 %   06/30/20 1606 (!) 100/50 -- -- (!) 116 -- 97 %   06/30/20 1601 (!) 79/49 -- -- (!) 126 (!) 30 (!) 86 %   06/30/20 1600 -- -- -- (!) 123 -- (!) 83 %   06/30/20 1545 (!) 164/67 36.3 °C (97.3 °F) Temporal 66 (!) 28 92 %       REVIEW OF SYSTEMS     14 point ROS completed and pertinent positives as listed in HPI or as stated. All others negative.    PAST MEDICAL HISTORY     No past medical history on file.    PAST SURGICAL HISTORY     No past surgical history on file.     FAMILY HISTORY     Non-contributory    SOCIAL HISTORY     Social History     Socioeconomic History   • Marital status: Single     Spouse name: Not on file   • Number of children: Not on file   • Years of education: Not on file   • Highest education level: Not on file   Occupational History   • Not on file   Social  Needs   • Financial resource strain: Not on file   • Food insecurity:     Worry: Not on file     Inability: Not on file   • Transportation needs:     Medical: Not on file     Non-medical: Not on file   Tobacco Use   • Smoking status: Never Smoker   • Smokeless tobacco: Never Used   Substance and Sexual Activity   • Alcohol use: Yes     Comment: occasional   • Drug use: Yes   • Sexual activity: Not on file   Lifestyle   • Physical activity:     Days per week: Not on file     Minutes per session: Not on file   • Stress: Not on file   Relationships   • Social connections:     Talks on phone: Not on file     Gets together: Not on file     Attends Jain service: Not on file     Active member of club or organization: Not on file     Attends meetings of clubs or organizations: Not on file     Relationship status: Not on file   • Intimate partner violence:     Fear of current or ex partner: Not on file     Emotionally abused: Not on file     Physically abused: Not on file     Forced sexual activity: Not on file   Other Topics Concern   • Not on file   Social History Narrative   • Not on file       HOME MEDICATIONS     Not in a hospital admission.    Tetanus:  Given in ED    ALLERGIES     No Known Allergies    PRIMARY CARE PHYSICIAN     Paige Lamb, DO       PHYSICAL EXAM      NEURO: GCS 14 (losing a point for eye opening). CN II-XII grossly intact. Non-focal on exam. Sensation Intact.    R L MOTOR (0-5):   5 5 C4 (deltoid)   5 5 C5 (biceps)   5 5 C6 (wrist ext)   5 5 C7 (triceps)   5 5 C8 (finger flexion)   5 5 T1 (hand intrinsics)   5 5 L2 (hip flexors)   5 5 L3 (quads) knee ext   5 5 L4 (TA) dorsiflex   5 5 L5 (EHL)    5 5 S1 (gastrocs) plantar flex     Anal Contraction: yes  Anal Sensation: yes    HEENT: NCAT. Midface is stable. NO malocclusion. JONES @ 3mm, EOMi; Nose/Mouth/TMs clear bilaterally. Neck supple. Trachea ML. abrasion  SPINE: Denies midline c-spine tenderness. C-Collar in situ. Denies T/L/S spine  tenderness. There are no stepoffs/deformities.   CHEST: Equal chest expansion, denies chest wall tenderness, no crepitus.  LUNGS: LCTA bilaterally. No use of accessory muscles or respiratory distress.   CV: sinus tachycardia, S1/S2. +2 radial/DP/femoral pulses.  ABDOMEN: Soft, nontender, nondistended.   PELVIS: Stable, non-tender with pelvic rocking.   : Genitalia unremarkable.  RECTAL: Digital rectal exam deferred. Heme negative externally.  EXTREMITIES: No palpable deformities.    SKIN: warm, dry, bilateral knee abrasions.    Fast Exam:  Equivocal by KASSIE Phelan with Karen Geller MD    AP Present:  KASSIE Phelan    DIAGNOSTIC DATA     LABS:  Recent Results (from the past 24 hour(s))   POCT Glucose    Collection Time: 06/30/20  3:52 PM   Result Value Ref Range    POCT Bedside Glucose 153 (H) 70 - 99 mg/dL    POC Test POC    CBC and differential    Collection Time: 06/30/20  4:00 PM   Result Value Ref Range    WBC 12.97 (H) 3.80 - 10.50 K/uL    RBC 5.55 4.50 - 5.80 M/uL    Hemoglobin 17.2 13.7 - 17.5 g/dL    Hematocrit 55.1 (H) 40.1 - 51.0 %    MCV 99.3 (H) 83.0 - 98.0 fL    MCH 31.0 28.0 - 33.2 pg    MCHC 31.2 (L) 32.2 - 36.5 g/dL    RDW 12.7 11.6 - 14.4 %    Platelets 325 150 - 350 K/uL    MPV 9.6 9.4 - 12.4 fL    Differential Type Manu     Neutrophils 45 %    Lymphocytes 44 %    Monocytes 7 %    Eosinophils 2 %    Basophils 0 %    Atypical Lymphocytes 2 %    Neutrophils, Absolute 5.84 1.70 - 7.00 K/uL    Lymphocytes, Absolute 5.71 (H) 1.20 - 3.50 K/uL    Monocytes, Absolute 0.91 0.30 - 1.00 K/uL    Eosinophils, Absolute 0.26 0.04 - 0.54 K/uL    Basophils, Absolute 0.00 (L) 0.01 - 0.10 K/uL    PLT Morphology Normal     Platelet Estimate Adequate (150,000-400,000)     Macrocytes 1+     Poikilocytes 1+    Troponin I    Collection Time: 06/30/20  4:00 PM   Result Value Ref Range    Troponin I <0.03 <0.05 ng/mL   Protime-INR    Collection Time: 06/30/20  4:00 PM   Result Value Ref Range    PT 14.6 (H)  12.2 - 14.5 sec    INR 1.2   INR   APTT    Collection Time: 06/30/20  4:00 PM   Result Value Ref Range    PTT 24 23 - 35 sec   Type and screen    Collection Time: 06/30/20  4:00 PM   Result Value Ref Range    Antibody Screen Negative     ABO O     Rh Factor Positive     History Check Previous type on file    Comprehensive metabolic panel    Collection Time: 06/30/20  4:00 PM   Result Value Ref Range    Sodium 143 136 - 144 mEQ/L    Potassium 4.2 3.6 - 5.1 mEQ/L    Chloride 104 98 - 109 mEQ/L    CO2 7 (LL) 22 - 32 mEQ/L    BUN 16 8 - 20 mg/dL    Creatinine 2.0 (H) 0.8 - 1.3 mg/dL    Glucose 158 (H) 70 - 99 mg/dL    Calcium 9.8 8.9 - 10.3 mg/dL    AST (SGOT) 41 15 - 41 IU/L    ALT (SGPT) 34 16 - 63 IU/L    Alkaline Phosphatase 75 35 - 126 IU/L    Total Protein 7.6 6.0 - 8.2 g/dL    Albumin 4.4 3.4 - 5.0 g/dL    Bilirubin, Total 0.6 0.3 - 1.2 mg/dL    eGFR 44.2 (L) >=60.0 mL/min/1.73m*2    Anion Gap 32 (H) 3 - 15 mEQ/L   Lactate, w/ reflex repeat if > 2.0    Collection Time: 06/30/20  4:00 PM   Result Value Ref Range    Lactate >13.0 (HH) 0.4 - 2.0 mmol/L   ER toxicology screen, serum    Collection Time: 06/30/20  4:00 PM   Result Value Ref Range    Salicylate <4.0 <=30.0 mg/dL    Acetaminophen <10.0 (L) 10.0 - 30.0 ug/mL    Ethanol <5 <=10 mg/dL   ABG    Collection Time: 06/30/20  4:55 PM   Result Value Ref Range    pH, Arterial 7.30 (L) 7.35 - 7.45    pCO2, Arterial 36 35 - 48 mm Hg    pO2, Arterial 74 (L) 83 - 100 mm Hg    HCO3, Arterial 17.7 (L) 21.0 - 28.0 mEQ/L    Base Excess, Arterial -7.9 mEQ/L    Source Of Oxygen nasal cannula     FIO2 6L    Lactic acid, Venous    Collection Time: 06/30/20  5:36 PM   Result Value Ref Range    Lactate 4.7 (HH) 0.4 - 2.0 mmol/L        Creatinine clearance cannot be calculated (Unknown ideal weight.)    IMAGINGS:  Ct Head Without Iv Contrast    Result Date: 6/30/2020  IMPRESSION:  No acute intracranial abnormality.  No evidence for acute fracture or traumatic subluxation of the  cervical spine. COMMENT: A standard noncontrast head CT was performed. Noncontrast CT examination of the cervical spine performed following the standard protocol. Sagittal and coronal reformations rendered from axial source images. Images reviewed in bone and soft tissue windows. CT DOSE:  One or more dose reduction techniques (e.g. automated exposure control, adjustment of the mA and/or kV according to patient size, use of iterative reconstruction technique) utilized for this examination. Comparison:  Head an cervical spine CT dated 11/22/2017. Findings:  Sulci, ventricles and basal cisterns are within normal limits for patient's age.   Attenuation in the brain parenchyma appears within normal limits.  No acute hemorrhage, acute territorial infarct, or mass effect is seen. There is no extra-axial fluid collection.  The visualized paranasal sinuses are clear.   Mastoid air cells are clear.  There is metallic radiopaque foreign body in the left temporal scalp as well as more inferiorly in the left lower neck adjacent to the left masseter muscle. Cervicothoracic alignment: Anatomic. Prevertebral soft tissues: Normal in thickness. Vertebral bodies: Normal in height.  No acute fractures. Intervertebral discs: Normal in height. Cervical and upper thoracic spinal canal: Patent.     Ct Chest With Iv Contrast    Result Date: 6/30/2020  IMPRESSION: 1. No acute posttraumatic abnormality in the chest, abdomen or pelvis. 2.  Lipomatous mural thickening of the ascending colon has developed since 11/22/2017 and may be sequela of interval infection/inflammation.    Ct Cervical Spine Without Iv Contrast    Result Date: 6/30/2020  IMPRESSION:  No acute intracranial abnormality.  No evidence for acute fracture or traumatic subluxation of the cervical spine. COMMENT: A standard noncontrast head CT was performed. Noncontrast CT examination of the cervical spine performed following the standard protocol. Sagittal and coronal  reformations rendered from axial source images. Images reviewed in bone and soft tissue windows. CT DOSE:  One or more dose reduction techniques (e.g. automated exposure control, adjustment of the mA and/or kV according to patient size, use of iterative reconstruction technique) utilized for this examination. Comparison:  Head an cervical spine CT dated 11/22/2017. Findings:  Sulci, ventricles and basal cisterns are within normal limits for patient's age.   Attenuation in the brain parenchyma appears within normal limits.  No acute hemorrhage, acute territorial infarct, or mass effect is seen. There is no extra-axial fluid collection.  The visualized paranasal sinuses are clear.   Mastoid air cells are clear.  There is metallic radiopaque foreign body in the left temporal scalp as well as more inferiorly in the left lower neck adjacent to the left masseter muscle. Cervicothoracic alignment: Anatomic. Prevertebral soft tissues: Normal in thickness. Vertebral bodies: Normal in height.  No acute fractures. Intervertebral discs: Normal in height. Cervical and upper thoracic spinal canal: Patent.     Ct Abdomen Pelvis With Iv Contrast    Result Date: 6/30/2020  IMPRESSION: 1. No acute posttraumatic abnormality in the chest, abdomen or pelvis. 2.  Lipomatous mural thickening of the ascending colon has developed since 11/22/2017 and may be sequela of interval infection/inflammation.    X-ray Chest 1 View    Result Date: 6/30/2020  IMPRESSION: No acute abnormality in the chest. COMMENT: An AP radiograph of the chest is reviewed with direct comparison to prior study of  11/22/2017. The lung volumes are shallow without focal consolidation to suggest pneumonia. There is no pleural effusion, pulmonary edema, or pneumothorax. The cardiomediastinal silhouette is within normal limits.      CONSULTS      SEE SEPARATE NOTE FOR CONSULT TIMES.       IMPRESSION/PLAN     45 y.o. male s/p found down    CT imaging reviewed. No evidence of  traumatic injury  Cervical collar cleared by NEXUS criteria.  No further Trauma needs.  Labs significant for metabolic derangements including Lactate > 13, CO2 7, Creat 2.0, Anion Gap 32. These findings were discussed with Dr. Odonnell. I recommended Medicine eval.      Karen Diamond MD  6/30/2020  6:09 PM

## 2020-06-30 NOTE — PROGRESS NOTES
Spoke with patient and confirmed with medication list from [facility] and/or patient's own pharmacy to complete the medication reconciliation.     Prior to admission medication list:    Current Outpatient Medications:   •  albuterol HFA (VENTOLIN HFA) 90 mcg/actuation inhaler, Inhale 2 puffs every 6 (six) hours as needed for wheezing., Disp: , Rfl:

## 2020-06-30 NOTE — PROGRESS NOTES
ACTIVATION/Consult TIMES     Activation or Time Notified: 1554   Level of Trauma: CODE 9   Trauma Arrival Time: 1600 Time Patient Seen: 1600      CONSULTS      Consultant Emergent  Urgent    Routine Time Paged Time Responded EMERGENT Arrival Time                        *emergent requires <30 minutes response on site; urgent requires <12 hours response on site.

## 2020-06-30 NOTE — ED PROVIDER NOTES
HPI     Chief Complaint   Patient presents with   • Altered Mental Status         Head Injury   Location:  Occipital  Mechanism of injury: fall    Fall:     Fall occurred:  Unable to specify    Impact surface:  Unable to specify    Point of impact:  Knees and head  Pain details:     Quality:  Aching    Severity:  No pain    Timing:  Constant    Progression:  Unchanged  Relieved by:  None tried  Ineffective treatments:  None tried  Associated symptoms: disorientation, headache, loss of consciousness and nausea    Associated symptoms: no vomiting    Risk factors: obesity    Risk factors: no alcohol use         Patient History     No past medical history on file.    No past surgical history on file.    No family history on file.    Social History     Tobacco Use   • Smoking status: Never Smoker   • Smokeless tobacco: Never Used   Substance Use Topics   • Alcohol use: Yes     Comment: occasional   • Drug use: Yes       Systems Reviewed from Nursing Triage:          Review of Systems     Review of Systems   Constitutional: Negative for fatigue and fever.   HENT: Negative for sore throat and trouble swallowing.    Respiratory: Negative for cough and shortness of breath.    Cardiovascular: Negative for chest pain and palpitations.   Gastrointestinal: Positive for nausea. Negative for abdominal pain and vomiting.   Genitourinary: Negative for dysuria and flank pain.   Musculoskeletal: Negative for arthralgias and back pain.   Skin: Negative for color change and rash.   Neurological: Positive for loss of consciousness and headaches. Negative for syncope.   All other systems reviewed and are negative.       Physical Exam     ED Triage Vitals   Temp Heart Rate Resp BP SpO2   06/30/20 1545 06/30/20 1545 06/30/20 1545 06/30/20 1545 06/30/20 1545   36.3 °C (97.3 °F) 66 (!) 28 (!) 164/67 92 %      Temp Source Heart Rate Source Patient Position BP Location FiO2 (%) (Set)   06/30/20 1545 06/30/20 1601 06/30/20 1545 06/30/20 1601 --    Temporal Monitor Sitting Right upper arm                      Patient Vitals for the past 24 hrs:   BP Temp Temp src Pulse Resp SpO2   06/30/20 1606 (!) 100/50 -- -- (!) 116 -- 97 %   06/30/20 1601 (!) 79/49 -- -- (!) 126 (!) 30 (!) 86 %   06/30/20 1600 -- -- -- (!) 123 -- (!) 83 %   06/30/20 1545 (!) 164/67 36.3 °C (97.3 °F) Temporal 66 (!) 28 92 %                                          Physical Exam   Constitutional: He appears well-nourished. He appears distressed.   HENT:   Head: Normocephalic.   Posterior scalp lac   Eyes: Pupils are equal, round, and reactive to light. Conjunctivae and EOM are normal. No scleral icterus.   Neck: Neck supple. No JVD present.   C-collar in place (from ED)   Cardiovascular: Tachycardia present.   Pulmonary/Chest: Effort normal and breath sounds normal. No respiratory distress.   Abdominal: Soft. There is no tenderness.   Musculoskeletal: He exhibits no edema or tenderness.   Neurological: He is alert. He is disoriented. GCS eye subscore is 4. GCS verbal subscore is 4. GCS motor subscore is 6.   Generalized weakness   Skin: Skin is warm and dry.   Psychiatric: He has a normal mood and affect. His behavior is normal.   Nursing note and vitals reviewed.           Critical Care  Performed by: Lex Odonnell MD  Authorized by: Lex Odonnell MD     Critical care provider statement:     Critical care time (minutes):  35    Critical care time was exclusive of:  Separately billable procedures and treating other patients    Critical care was time spent personally by me on the following activities:  Development of treatment plan with patient or surrogate, discussions with consultants, discussions with primary provider, evaluation of patient's response to treatment, examination of patient, ordering and performing treatments and interventions, ordering and review of laboratory studies, ordering and review of radiographic studies, pulse oximetry, re-evaluation of patient's  condition and review of old charts        Labs Reviewed   POCT GLUCOSE (BEAKER) - Abnormal       Result Value    POCT Bedside Glucose 153 (*)     POC Test POC     CBC AND DIFF   TROPONIN I   RAINBOW DRAW PANEL    Narrative:     The following orders were created for panel order [place] Draw Panel.  Procedure                               Abnormality         Status                     ---------                               -----------         ------                     RAINBOW LT BLUE[[id number]]                                                                Please view results for these tests on the individual orders.   PROTIME-INR   PTT   TYPE AND SCREEN   COMPREHENSIVE METABOLIC PANEL   LACTATE, VENOUS   ER TOXICOLOGY SCR, SERUM   DRUG SCREEN PANEL, URINE   CBC AND DIFF   COMPREHENSIVE METABOLIC PANEL   ETHANOL   TYPE AND SCREEN   LACTATE, VENOUS   PROTIME-INR   PTT   URINALYSIS HOLD FOR CULTURE (INPATIENT ONLY)    Narrative:     The following orders were created for panel order Urinalysis hold for Urine Culture.  Procedure                               Abnormality         Status                     ---------                               -----------         ------                     UA Hold for UC (Macro)[[id number]]                                                        Please view results for these tests on the individual orders.   DRUG SCREEN PANEL, URINE   UA HOLD FOR UC (MACRO)   RAINBOW LT BLUE       CT HEAD WITHOUT IV CONTRAST    (Results Pending)   ECG 12 lead    (Results Pending)   CT CERVICAL SPINE WITHOUT IV CONTRAST    (Results Pending)   X-RAY CHEST 1 VIEW    (Results Pending)   CT CHEST WITH IV CONTRAST    (Results Pending)   CT ABDOMEN PELVIS WITH IV CONTRAST    (Results Pending)               ED Course & MDM     MDM         ED Course as of Jul 02 2306   Tue Jun 30, 2020   1555 Code 9 activated    Pt found unresponsive outside by cousin. Pt has no recollection of events. Admits to smoking marijuana  earlier.   He responds slowly, but appropriately to questions and is able to move all 4 extremities, but with poor effort  Will check labs, pan CT scan and reassess.    [OVIDIO]   1603 Trauma at bedside    Pt had episode of retching, after which he became diaphoretic and BP transiently dropped to 79-90. IVF started.    [OVIDIO]   1652 Lactate>13  WBC 13  HCT neg    [OVIDIO]   1654 CO2 7  Cr 2 (last 1.2)    [OVIDIO]   1717 Trauma has signed off on case. Rec medicine admission.    [OVIDIO]   1728 Case discussed with ICU resident Dr. Bolanos who recommends rechecking lactate and serum osmolality. Will reassess,    [OVIDIO]      ED Course User Index  [OVIDIO] Lex Odonnell MD         Clinical Impressions as of Jul 02 2306   Acute renal failure, unspecified acute renal failure type (CMS/HCC)   Lactic acidosis   SIRS (systemic inflammatory response syndrome) (CMS/HCC)   Traumatic injury of head, initial encounter   Polysubstance abuse (CMS/HCC)        Lex Odonnell MD  07/02/20 4523

## 2020-07-01 ENCOUNTER — APPOINTMENT (INPATIENT)
Dept: NEUROLOGY | Facility: HOSPITAL | Age: 45
DRG: 312 | End: 2020-07-01
Attending: HOSPITALIST
Payer: COMMERCIAL

## 2020-07-01 PROBLEM — M62.82 NON-TRAUMATIC RHABDOMYOLYSIS: Status: ACTIVE | Noted: 2020-07-01

## 2020-07-01 PROBLEM — R93.5 ABNORMAL ABDOMINAL CT SCAN: Status: ACTIVE | Noted: 2020-07-01

## 2020-07-01 LAB
ANION GAP SERPL CALC-SCNC: 10 MEQ/L (ref 3–15)
ANION GAP SERPL CALC-SCNC: 8 MEQ/L (ref 3–15)
APTT PPP: 24 SEC (ref 23–35)
ATRIAL RATE: 121
ATRIAL RATE: 77
BUN SERPL-MCNC: 11 MG/DL (ref 8–20)
BUN SERPL-MCNC: 8 MG/DL (ref 8–20)
CALCIUM SERPL-MCNC: 8.3 MG/DL (ref 8.9–10.3)
CALCIUM SERPL-MCNC: 8.5 MG/DL (ref 8.9–10.3)
CHLORIDE SERPL-SCNC: 109 MEQ/L (ref 98–109)
CHLORIDE SERPL-SCNC: 109 MEQ/L (ref 98–109)
CK SERPL-CCNC: 2082 U/L (ref 16–300)
CK SERPL-CCNC: 2890 U/L (ref 16–300)
CO2 SERPL-SCNC: 22 MEQ/L (ref 22–32)
CO2 SERPL-SCNC: 25 MEQ/L (ref 22–32)
CREAT SERPL-MCNC: 1.3 MG/DL (ref 0.8–1.3)
CREAT SERPL-MCNC: 1.5 MG/DL (ref 0.8–1.3)
ERYTHROCYTE [DISTWIDTH] IN BLOOD BY AUTOMATED COUNT: 13.1 % (ref 11.6–14.4)
GFR SERPL CREATININE-BSD FRML MDRD: >60 ML/MIN/1.73M*2
GFR SERPL CREATININE-BSD FRML MDRD: >60 ML/MIN/1.73M*2
GLUCOSE SERPL-MCNC: 100 MG/DL (ref 70–99)
GLUCOSE SERPL-MCNC: 84 MG/DL (ref 70–99)
HCT VFR BLDCO AUTO: 41.7 % (ref 40.1–51)
HGB BLD-MCNC: 13.9 G/DL (ref 13.7–17.5)
INR PPP: 1.2 INR
LACTATE SERPL-SCNC: 1.5 MMOL/L (ref 0.4–2)
MCH RBC QN AUTO: 30.8 PG (ref 28–33.2)
MCHC RBC AUTO-ENTMCNC: 33.3 G/DL (ref 32.2–36.5)
MCV RBC AUTO: 92.3 FL (ref 83–98)
P AXIS: 27
P AXIS: 63
PDW BLD AUTO: 9.4 FL (ref 9.4–12.4)
PLATELET # BLD AUTO: 235 K/UL (ref 150–350)
POTASSIUM SERPL-SCNC: 3.7 MEQ/L (ref 3.6–5.1)
POTASSIUM SERPL-SCNC: 3.8 MEQ/L (ref 3.6–5.1)
PR INTERVAL: 152
PR INTERVAL: 164
PROTHROMBIN TIME: 14.6 SEC (ref 12.2–14.5)
QRS DURATION: 74
QRS DURATION: 80
QT INTERVAL: 326
QT INTERVAL: 404
QTC CALCULATION(BAZETT): 457
QTC CALCULATION(BAZETT): 462
R AXIS: 13
R AXIS: 48
RBC # BLD AUTO: 4.52 M/UL (ref 4.5–5.8)
SODIUM SERPL-SCNC: 141 MEQ/L (ref 136–144)
SODIUM SERPL-SCNC: 142 MEQ/L (ref 136–144)
T WAVE AXIS: -1
T WAVE AXIS: 65
VENTRICULAR RATE: 121
VENTRICULAR RATE: 77
WBC # BLD AUTO: 10.83 K/UL (ref 3.8–10.5)

## 2020-07-01 PROCEDURE — 80048 BASIC METABOLIC PNL TOTAL CA: CPT | Performed by: HOSPITALIST

## 2020-07-01 PROCEDURE — 99255 IP/OBS CONSLTJ NEW/EST HI 80: CPT | Performed by: PSYCHIATRY & NEUROLOGY

## 2020-07-01 PROCEDURE — 85027 COMPLETE CBC AUTOMATED: CPT | Performed by: HOSPITALIST

## 2020-07-01 PROCEDURE — 21400000 HC ROOM AND CARE CCU/INTERMEDIATE

## 2020-07-01 PROCEDURE — 95819 EEG AWAKE AND ASLEEP: CPT

## 2020-07-01 PROCEDURE — 82550 ASSAY OF CK (CPK): CPT | Performed by: HOSPITALIST

## 2020-07-01 PROCEDURE — 36415 COLL VENOUS BLD VENIPUNCTURE: CPT | Performed by: HOSPITALIST

## 2020-07-01 PROCEDURE — 99233 SBSQ HOSP IP/OBS HIGH 50: CPT | Performed by: HOSPITALIST

## 2020-07-01 PROCEDURE — 95819 EEG AWAKE AND ASLEEP: CPT | Mod: 26 | Performed by: PSYCHIATRY & NEUROLOGY

## 2020-07-01 PROCEDURE — 93010 ELECTROCARDIOGRAM REPORT: CPT | Mod: 77 | Performed by: INTERNAL MEDICINE

## 2020-07-01 PROCEDURE — 93010 ELECTROCARDIOGRAM REPORT: CPT | Performed by: INTERNAL MEDICINE

## 2020-07-01 PROCEDURE — 25800000 HC PHARMACY IV SOLUTIONS: Performed by: HOSPITALIST

## 2020-07-01 PROCEDURE — 83605 ASSAY OF LACTIC ACID: CPT | Performed by: HOSPITALIST

## 2020-07-01 RX ORDER — ALBUTEROL SULFATE 0.83 MG/ML
2.5 SOLUTION RESPIRATORY (INHALATION) EVERY 6 HOURS PRN
Status: DISCONTINUED | OUTPATIENT
Start: 2020-07-01 | End: 2020-07-02

## 2020-07-01 RX ORDER — SODIUM CHLORIDE 450 MG/100ML
INJECTION, SOLUTION INTRAVENOUS CONTINUOUS
Status: DISCONTINUED | OUTPATIENT
Start: 2020-07-01 | End: 2020-07-01

## 2020-07-01 RX ORDER — SODIUM CHLORIDE 9 MG/ML
INJECTION, SOLUTION INTRAVENOUS CONTINUOUS
Status: DISCONTINUED | OUTPATIENT
Start: 2020-07-01 | End: 2020-07-04 | Stop reason: HOSPADM

## 2020-07-01 RX ADMIN — SODIUM CHLORIDE: 9 INJECTION, SOLUTION INTRAVENOUS at 16:15

## 2020-07-01 RX ADMIN — SODIUM CHLORIDE: 9 INJECTION, SOLUTION INTRAVENOUS at 21:39

## 2020-07-01 RX ADMIN — SODIUM CHLORIDE: 9 INJECTION, SOLUTION INTRAVENOUS at 10:27

## 2020-07-01 NOTE — ASSESSMENT & PLAN NOTE
Improving already w/ IVF, no obvious history for prerenal injury however.  Last Cr on file 1.2 in 2018.  --improving this am, 1.3

## 2020-07-01 NOTE — PLAN OF CARE
"ZACK met with the patient to complete his D&A assessment.  The patient reports daily use of marijuana and admitted that he used ecstasy, \"just one time, but if my  finds out & says I have to go to rehab, is there something I can do cause I don't have insurance?\".  ZACK explained to him that he could apply for funding through [facility], and the [facility] may fund him, or his  can ask the courts to pay for it due to him not having insurance.  ZACK will place the information on his discharge papers in the event that he needs to get funding for treatment.  ZACK will remain available if the patient requires further assistance or support.   Samantha Henley MSW, LSW  Pgr: [phone]  "
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
## 2020-07-01 NOTE — RESULT ENCOUNTER NOTE
Patient is admitted at [facility] on 3SW. COVID-19 negative, there is no infection banner present in the patient header.
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
## 2020-07-01 NOTE — ASSESSMENT & PLAN NOTE
In setting of lactic acidosis and JAIMIE  Resolving w/ resolution of lactic acidosis and improvement in Cr.
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
## 2020-07-01 NOTE — PROGRESS NOTES
Hospital Medicine Service -  Daily Progress Note       SUBJECTIVE   Interval History: Patient w/o complaints except R hand swollen and R AC IV uncomfortable. Does not recall events, no similar episode in past. No dizziness, CP, other complaints.      OBJECTIVE      Vital signs in last 24 hours:  Temp:  [36.3 °C (97.3 °F)-36.8 °C (98.3 °F)] 36.6 °C (97.8 °F)  Heart Rate:  [] 78  Resp:  [18-40] 20  BP: ()/(49-81) 110/54    Intake/Output Summary (Last 24 hours) at 7/1/2020 0976  Last data filed at 7/1/2020 0255  Gross per 24 hour   Intake 4000 ml   Output --   Net 4000 ml       PHYSICAL EXAMINATION      Physical Exam   Constitutional: He is oriented to person, place, and time. He appears well-developed and well-nourished. No distress.   Cardiovascular: Normal rate and regular rhythm.   Pulmonary/Chest: Effort normal and breath sounds normal.   Abdominal: Soft. Bowel sounds are normal. He exhibits no distension.   Musculoskeletal: He exhibits edema.   Neurological: He is alert and oriented to person, place, and time.   Psychiatric: He has a normal mood and affect.   Nursing note and vitals reviewed.     LINES, CATHETERS, DRAINS, AIRWAYS, AND WOUNDS   Lines, Drains, Airways, Wounds:  Peripheral IV 06/30/20 Right Antecubital (Active)   Number of days: 1       Peripheral IV 06/30/20 Left Antecubital (Active)   Number of days: 1       Wound Laceration Posterior Head (Active)   Number of days: 1       Wound Abrasion Bilateral;Lower;Anterior Knee (Active)   Number of days: 1       Comments:      LABS / IMAGING / TELE      Labs  I have reviewed the patient's pertinent labs.  Significant abnormals are CK 2082, Cr 1.3.    Imaging  I have independently reviewed the pertinent imaging from the last 24 hrs.    ECG/Telemetry  I have independently reviewed the telemetry. No events for the last 24 hours.    ASSESSMENT AND PLAN      * Loss of consciousness (CMS/HCC)  Assessment & Plan  Unclear trigger, story most  concerning for seizure, less likely cardiogenic syncope.    Monitor on tele  Check EEG, consult neurology, await recs  Seizure and falls precautions    Metabolic acidosis  Assessment & Plan  In setting of lactic acidosis and JAIMIE  Resolving w/ resolution of lactic acidosis and improvement in Cr.    JAIMIE (acute kidney injury) (CMS/HCC)  Assessment & Plan  Improving already w/ IVF, no obvious history for prerenal injury however.  Last Cr on file 1.2 in 2018.  --improving this am, 1.3    Lactic acidosis  Assessment & Plan  Concerning for possible seizure, resolved w/ IVF    Non-traumatic rhabdomyolysis  Assessment & Plan  Mild, likely in setting of being down +/- seizure  CK rising, will continue IVF and repeat in afternoon    Abnormal abdominal CT scan  Assessment & Plan  CT shows lipomatous mural thickening of ascending colon new since 2017.  No active symptoms to suggest colitis.    Would recommend OP f/u for surveillance imaging vs. OP GI f/u for possible colonoscopy.         VTE Assessment: Padua VTE Score: 1  VTE Prophylaxis Plan: ambulation  Code Status: Full Code  Estimated Discharge Date: 7/3/2020  Disposition Planning: home     Lakshmi Hopkins MD  7/1/2020

## 2020-07-01 NOTE — ASSESSMENT & PLAN NOTE
CT shows lipomatous mural thickening of ascending colon new since 2017.  No active symptoms to suggest colitis.    Would recommend OP f/u for surveillance imaging vs. OP GI f/u for possible colonoscopy.

## 2020-07-01 NOTE — ASSESSMENT & PLAN NOTE
Mild, likely in setting of being down +/- seizure  CK rising, will continue IVF and repeat in afternoon    - CK this morning ~1500 (Peak 2890) with mild uptrend this afternoon to 2180. Discharge home today. Discussed drinking plenty of fluids

## 2020-07-01 NOTE — PLAN OF CARE
Problem: Adult Inpatient Plan of Care  Goal: Readiness for Transition of Care  Intervention: Mutually Develop Transition Plan  Flowsheets (Taken 7/1/2020 8633)  Anticipated Discharge Disposition: home without services  Equipment Needed After Discharge: none  Discharge Coordination/Progress: SW attempted to s/w pt bedside, medical team at bedside. Per chart review- pt is independent at baseline. Plan for pt to d/c home no needs when stable for d/c.  Equipment Currently Used at Home: none  Anticipated Changes Related to Illness: none  Readmission Within the Last 30 Days: no previous admission in last 30 days  Patient/Family Anticipated Services at Transition: none  Patient/Family Anticipates Transition to: home  Concerns to be Addressed: no discharge needs identified

## 2020-07-01 NOTE — PLAN OF CARE
Problem: Adult Inpatient Plan of Care  Goal: Plan of Care Review  Outcome: Progressing  Flowsheets (Taken 7/1/2020 0521)  Progress: no change  Plan of Care Reviewed With: patient  Outcome Summary: Patient admitted from ED last night. Denies pain, SOB. Vital signs stable. Awake, alert and oriented. Had one episode of nausea/vomiting. Dr. Timothy Bonner was notified last night. No more episodes overnight. Patient reports feeling better this AM. IV fluids completed. Fall safety measures in place.

## 2020-07-02 LAB
ANION GAP SERPL CALC-SCNC: 5 MEQ/L (ref 3–15)
BUN SERPL-MCNC: 5 MG/DL (ref 8–20)
CALCIUM SERPL-MCNC: 8.3 MG/DL (ref 8.9–10.3)
CHLORIDE SERPL-SCNC: 109 MEQ/L (ref 98–109)
CK SERPL-CCNC: 2200 U/L (ref 16–300)
CK SERPL-CCNC: 2535 U/L (ref 16–300)
CO2 SERPL-SCNC: 25 MEQ/L (ref 22–32)
CREAT SERPL-MCNC: 1.3 MG/DL (ref 0.8–1.3)
ERYTHROCYTE [DISTWIDTH] IN BLOOD BY AUTOMATED COUNT: 12.9 % (ref 11.6–14.4)
GFR SERPL CREATININE-BSD FRML MDRD: >60 ML/MIN/1.73M*2
GLUCOSE SERPL-MCNC: 119 MG/DL (ref 70–99)
HCT VFR BLDCO AUTO: 42.3 % (ref 40.1–51)
HGB BLD-MCNC: 14 G/DL (ref 13.7–17.5)
MCH RBC QN AUTO: 30.7 PG (ref 28–33.2)
MCHC RBC AUTO-ENTMCNC: 33.1 G/DL (ref 32.2–36.5)
MCV RBC AUTO: 92.8 FL (ref 83–98)
PDW BLD AUTO: 9.3 FL (ref 9.4–12.4)
PLATELET # BLD AUTO: 217 K/UL (ref 150–350)
POTASSIUM SERPL-SCNC: 3.6 MEQ/L (ref 3.6–5.1)
RBC # BLD AUTO: 4.56 M/UL (ref 4.5–5.8)
SODIUM SERPL-SCNC: 139 MEQ/L (ref 136–144)
WBC # BLD AUTO: 6.55 K/UL (ref 3.8–10.5)

## 2020-07-02 PROCEDURE — 36415 COLL VENOUS BLD VENIPUNCTURE: CPT | Performed by: HOSPITALIST

## 2020-07-02 PROCEDURE — 21400000 HC ROOM AND CARE CCU/INTERMEDIATE

## 2020-07-02 PROCEDURE — 200200 PR NO CHARGE: Performed by: HOSPITALIST

## 2020-07-02 PROCEDURE — 63700000 HC SELF-ADMINISTRABLE DRUG: Performed by: HOSPITALIST

## 2020-07-02 PROCEDURE — 85027 COMPLETE CBC AUTOMATED: CPT | Performed by: HOSPITALIST

## 2020-07-02 PROCEDURE — 82550 ASSAY OF CK (CPK): CPT | Performed by: HOSPITALIST

## 2020-07-02 PROCEDURE — 25800000 HC PHARMACY IV SOLUTIONS: Performed by: HOSPITALIST

## 2020-07-02 PROCEDURE — 99232 SBSQ HOSP IP/OBS MODERATE 35: CPT | Performed by: HOSPITALIST

## 2020-07-02 PROCEDURE — 80048 BASIC METABOLIC PNL TOTAL CA: CPT | Performed by: HOSPITALIST

## 2020-07-02 RX ORDER — ALBUTEROL SULFATE 0.83 MG/ML
2.5 SOLUTION RESPIRATORY (INHALATION) EVERY 4 HOURS PRN
Status: DISCONTINUED | OUTPATIENT
Start: 2020-07-02 | End: 2020-07-04 | Stop reason: HOSPADM

## 2020-07-02 RX ADMIN — ACETAMINOPHEN 650 MG: 325 TABLET, FILM COATED ORAL at 15:10

## 2020-07-02 RX ADMIN — SODIUM CHLORIDE: 9 INJECTION, SOLUTION INTRAVENOUS at 02:21

## 2020-07-02 RX ADMIN — SODIUM CHLORIDE: 9 INJECTION, SOLUTION INTRAVENOUS at 06:38

## 2020-07-02 RX ADMIN — SODIUM CHLORIDE: 9 INJECTION, SOLUTION INTRAVENOUS at 23:22

## 2020-07-02 RX ADMIN — SODIUM CHLORIDE: 9 INJECTION, SOLUTION INTRAVENOUS at 18:01

## 2020-07-02 RX ADMIN — SODIUM CHLORIDE: 9 INJECTION, SOLUTION INTRAVENOUS at 12:00

## 2020-07-02 NOTE — PROGRESS NOTES
Hospital Medicine Service -  Daily Progress Note       SUBJECTIVE   Interval History: Patient seen in am and again in afternoon, no complaints, no dizziness, sob, nausea, urine clear yellow.      OBJECTIVE      Vital signs in last 24 hours:  Temp:  [36.6 °C (97.8 °F)-37.1 °C (98.7 °F)] 36.7 °C (98.1 °F)  Heart Rate:  [66-84] 67  Resp:  [16-18] 18  BP: (138-158)/(75-88) 158/88    Intake/Output Summary (Last 24 hours) at 7/2/2020 1756  Last data filed at 7/2/2020 0742  Gross per 24 hour   Intake --   Output 1975 ml   Net -1975 ml       PHYSICAL EXAMINATION      Physical Exam   Constitutional: He is oriented to person, place, and time. He appears well-developed and well-nourished. No distress.   Cardiovascular: Normal rate and regular rhythm.   Pulmonary/Chest: Effort normal and breath sounds normal. He has no wheezes. He has no rales.   Abdominal: Soft. Bowel sounds are normal. He exhibits no distension.   Musculoskeletal: He exhibits no edema.   Neurological: He is alert and oriented to person, place, and time.   Psychiatric: He has a normal mood and affect.   Nursing note and vitals reviewed.     LINES, CATHETERS, DRAINS, AIRWAYS, AND WOUNDS   Lines, Drains, Airways, Wounds:  Peripheral IV 07/01/20 Anterior;Left Forearm (Active)   Number of days: 1       Wound Laceration Posterior Head (Active)   Number of days: 2       Wound Abrasion Bilateral;Lower;Anterior Knee (Active)   Number of days: 2       Comments:      LABS / IMAGING / TELE      Labs  I have reviewed the patient's pertinent labs.  Significant abnormals are Cr 1.3, CK 2535.    Imaging  I have independently reviewed the pertinent imaging from the last 24 hrs.    ECG/Telemetry  I have independently reviewed the telemetry. No events for the last 24 hours.    ASSESSMENT AND PLAN      * Loss of consciousness (CMS/HCC)  Assessment & Plan  Unclear trigger, story concerning for seizure, less likely cardiogenic syncope.    Monitor on tele  EEG w/o epileptiform  discharges, neurology suspects syncope rather than seizure in setting of hot weather/poor PO/MDMA/MJ  --no further events  --counselled on avoiding illicit drugs      Metabolic acidosis  Assessment & Plan  In setting of lactic acidosis and JAIMIE  Resolving w/ resolution of lactic acidosis and improvement in Cr.    JAIMIE (acute kidney injury) (CMS/HCC)  Assessment & Plan  Improving already w/ IVF, no obvious history for prerenal injury however.  Last Cr on file 1.2 in 2018.  --improving this am, 1.3    Lactic acidosis  Assessment & Plan  Concerning for possible seizure, resolved w/ IVF    Lactate cleared 1.5.     Non-traumatic rhabdomyolysis  Assessment & Plan  Mild, likely in setting of being down +/- seizure  CK rising, will continue IVF and repeat in afternoon    - CK this morning 2200 (Peak 2890) but back up to 2535 this pm with continued aggressive hydration, will hold on discharge    Abnormal abdominal CT scan  Assessment & Plan  CT shows lipomatous mural thickening of ascending colon new since 2017.  No active symptoms to suggest colitis.    Would recommend OP f/u for surveillance imaging vs. OP GI f/u for possible colonoscopy.         VTE Assessment: Padua VTE Score: 1  VTE Prophylaxis Plan: ambulation  Code Status: Full Code  Estimated Discharge Date: 7/2/2020  Disposition Planning: home     Lakshmi Hopkins MD  7/2/2020

## 2020-07-02 NOTE — PLAN OF CARE
Problem: Adult Inpatient Plan of Care  Goal: Plan of Care Review  Outcome: Progressing  Flowsheets (Taken 7/2/2020 4439)  Progress: improving  Plan of Care Reviewed With: patient  Outcome Summary: Patient stable without complaints. Tolerating all meds and care. CK remains elevated. Pt remains on IVF. Will continue to monitor.

## 2020-07-02 NOTE — PLAN OF CARE
Problem: Adult Inpatient Plan of Care  Goal: Plan of Care Review  7/2/2020 0527 by Nakul Smalls, RN  Outcome: Progressing  Flowsheets (Taken 7/2/2020 0526)  Progress: improving  Plan of Care Reviewed With: patient  Outcome Summary: Pt stable, VSS. Pt had no acute events. Pt cont IV fluids, tolerated well. Pt's most recent CK 2800, ndew labs pending. Pt denies pain s/p fall, ambulates well. Pt denies pain, FSP, call bell in reach, & bed alarm refused.
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
## 2020-07-03 LAB
ALBUMIN SERPL-MCNC: 3.6 G/DL (ref 3.4–5)
ALP SERPL-CCNC: 56 IU/L (ref 35–126)
ALT SERPL-CCNC: 38 IU/L (ref 16–63)
ANION GAP SERPL CALC-SCNC: 10 MEQ/L (ref 3–15)
AST SERPL-CCNC: 43 IU/L (ref 15–41)
BILIRUB SERPL-MCNC: 1 MG/DL (ref 0.3–1.2)
BUN SERPL-MCNC: 6 MG/DL (ref 8–20)
CALCIUM SERPL-MCNC: 8.7 MG/DL (ref 8.9–10.3)
CHLORIDE SERPL-SCNC: 105 MEQ/L (ref 98–109)
CK SERPL-CCNC: 2112 U/L (ref 16–300)
CK SERPL-CCNC: 2187 U/L (ref 16–300)
CO2 SERPL-SCNC: 23 MEQ/L (ref 22–32)
CREAT SERPL-MCNC: 1.3 MG/DL (ref 0.8–1.3)
GFR SERPL CREATININE-BSD FRML MDRD: >60 ML/MIN/1.73M*2
GLUCOSE SERPL-MCNC: 94 MG/DL (ref 70–99)
POTASSIUM SERPL-SCNC: 3.6 MEQ/L (ref 3.6–5.1)
PROT SERPL-MCNC: 6.5 G/DL (ref 6–8.2)
SODIUM SERPL-SCNC: 138 MEQ/L (ref 136–144)

## 2020-07-03 PROCEDURE — 99232 SBSQ HOSP IP/OBS MODERATE 35: CPT | Performed by: INTERNAL MEDICINE

## 2020-07-03 PROCEDURE — 21400000 HC ROOM AND CARE CCU/INTERMEDIATE

## 2020-07-03 PROCEDURE — 25800000 HC PHARMACY IV SOLUTIONS: Performed by: HOSPITALIST

## 2020-07-03 PROCEDURE — 36415 COLL VENOUS BLD VENIPUNCTURE: CPT | Performed by: HOSPITALIST

## 2020-07-03 PROCEDURE — 82550 ASSAY OF CK (CPK): CPT | Performed by: INTERNAL MEDICINE

## 2020-07-03 PROCEDURE — 82550 ASSAY OF CK (CPK): CPT | Performed by: HOSPITALIST

## 2020-07-03 PROCEDURE — 80053 COMPREHEN METABOLIC PANEL: CPT | Performed by: HOSPITALIST

## 2020-07-03 RX ADMIN — SODIUM CHLORIDE: 9 INJECTION, SOLUTION INTRAVENOUS at 19:58

## 2020-07-03 RX ADMIN — SODIUM CHLORIDE: 9 INJECTION, SOLUTION INTRAVENOUS at 04:12

## 2020-07-03 RX ADMIN — SODIUM CHLORIDE: 9 INJECTION, SOLUTION INTRAVENOUS at 15:01

## 2020-07-03 NOTE — PATIENT CARE CONFERENCE
Care Progression Rounds Note  Date: 7/3/2020  Time: 11:35 AM     Patient Name: Fernando Lyle     Medical Record Number: [id number]   YOB: 1975  Sex: Male      Room/Bed: 0390    Admitting Diagnosis: Lactic acidosis [E87.2]  Altered mental status [R41.82]  SIRS (systemic inflammatory response syndrome) (CMS/HCC) [R65.10]  Traumatic injury of head, initial encounter [S09.90XA]  Acute renal failure, unspecified acute renal failure type (CMS/HCC) [N17.9]   Admit Date/Time: 6/30/2020  3:42 PM    Primary Diagnosis: Loss of consciousness (CMS/HCC)  Principal Problem: Loss of consciousness (CMS/HCC)    GMLOS: 2.8  Anticipated Discharge Date: 7/3/2020    AM-PAC  Mobility Score: 24    Discharge Planning:  Anticipated Discharge Disposition: home without services    Barriers to Discharge:  Barriers to Discharge: None    Participants:  , nursing, physical therapy, physician, social work/services

## 2020-07-03 NOTE — PLAN OF CARE
Problem: Adult Inpatient Plan of Care  Goal: Plan of Care Review  Outcome: Progressing  Flowsheets (Taken 7/3/2020 1523)  Progress: improving  Plan of Care Reviewed With: patient  Outcome Summary: Patient stable with no complaints. Remains on IVF, increased PO intake of fluids. Awaiting CK level to determine if patient can go home. Tolerating all care. FSP maintained. Will continue to monitor.

## 2020-07-03 NOTE — PROGRESS NOTES
Hospital Medicine Service -  Daily Progress Note       SUBJECTIVE   Interval History: Patient feeling much better and trying to drink plenty of fluids     OBJECTIVE      Vital signs in last 24 hours:  Temp:  [36.5 °C (97.7 °F)-36.8 °C (98.2 °F)] 36.8 °C (98.2 °F)  Heart Rate:  [61-84] 65  Resp:  [16-18] 18  BP: (120-173)/(69-94) 144/94    Intake/Output Summary (Last 24 hours) at 7/3/2020 1745  Last data filed at 7/3/2020 1501  Gross per 24 hour   Intake 4636.67 ml   Output 2440 ml   Net 2196.67 ml       PHYSICAL EXAMINATION      Physical Exam   Constitutional: He is oriented to person, place, and time. He appears well-developed.   HENT:   Head: Normocephalic.   Eyes: Pupils are equal, round, and reactive to light.   Neck: No JVD present.   Cardiovascular: Normal rate, regular rhythm and normal heart sounds. Exam reveals no friction rub.   No murmur heard.  Pulmonary/Chest: Breath sounds normal. No stridor. No respiratory distress. He has no wheezes.   Abdominal: Soft. Bowel sounds are normal. He exhibits no distension. There is no tenderness. There is no guarding.   Musculoskeletal: He exhibits no edema.   Neurological: He is alert and oriented to person, place, and time.   Skin: Skin is warm and dry.   Psychiatric: He has a normal mood and affect.      LINES, CATHETERS, DRAINS, AIRWAYS, AND WOUNDS   Lines, Drains, Airways, Wounds:  Peripheral IV 07/01/20 Anterior;Left Forearm (Active)   Number of days: 2       Wound Laceration Posterior Head (Active)   Number of days: 3       Wound Abrasion Bilateral;Lower;Anterior Knee (Active)   Number of days: 3       Comments:      LABS / IMAGING / TELE      Labs  Results from last 7 days   Lab Units 07/03/20  1557 07/03/20  0730 07/02/20  1642   CK TOTAL U/L 2,187* 2,112* 2,535*         Imaging  I have independently reviewed the pertinent imaging from the last 24 hrs.    ECG/Telemetry  I have independently reviewed the telemetry. No events for the last 24  hours.    ASSESSMENT AND PLAN      * Loss of consciousness (CMS/HCC)  Assessment & Plan  Unclear trigger, story concerning for seizure, less likely cardiogenic syncope.    Monitor on tele  EEG w/o epileptiform discharges, neurology suspects syncope rather than seizure in setting of hot weather/poor PO/MDMA/MJ  --no further events  --counselled on avoiding illicit drugs.      Non-traumatic rhabdomyolysis  Assessment & Plan  Mild, likely in setting of being down +/- seizure  CK rising, will continue IVF and repeat in afternoon    - CK this morning 2100 (Peak 2890) with mild uptrend this afternoon to 2180. Hold discharge until tomorrow    Abnormal abdominal CT scan  Assessment & Plan  CT shows lipomatous mural thickening of ascending colon new since 2017.  No active symptoms to suggest colitis.    Would recommend OP f/u for surveillance imaging vs. OP GI f/u for possible colonoscopy.    Metabolic acidosis  Assessment & Plan  In setting of lactic acidosis and JAIMIE  Resolving w/ resolution of lactic acidosis and improvement in Cr.    JAIMIE (acute kidney injury) (CMS/[facility])  Assessment & Plan  Improving already w/ IVF, no obvious history for prerenal injury however.  Last Cr on file 1.2 in 2018.  --improving this am, 1.3    Lactic acidosis  Assessment & Plan  Concerning for possible seizure, resolved w/ IVF    Lactate cleared 1.5.        VTE Assessment: [place] VTE Score: 1  VTE Prophylaxis Plan: Ambulation  Code Status: Full Code  Estimated Discharge Date: 7/3/2020  Disposition Planning: Pending improvement of CPK     Gely Duarte MD  7/3/2020

## 2020-07-03 NOTE — PLAN OF CARE
Problem: Adult Inpatient Plan of Care  Goal: Plan of Care Review  Outcome: Progressing  Flowsheets (Taken 7/3/2020 1883)  Progress: no change  Plan of Care Reviewed With: patient  Outcome Summary: Pt stable t/o this shift. No c/o pain or SOB. VSS. Maintained on IVF NSS infusing at 200mL/hr. Labs pending. Call bell within reach; will monitor.
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
## 2020-07-03 NOTE — PLAN OF CARE
Problem: Adult Inpatient Plan of Care  Goal: Plan of Care Review  Outcome: Progressing  Flowsheets (Taken 7/3/2020 1223)  Progress: improving  Outcome Summary: Per info in medical rounds today, pt is stable for d/c. Plans for pt to d/c home no needs. SW will remain available for emoitonal support and dispo planning. Ling ESPINO [id number]

## 2020-07-04 VITALS
DIASTOLIC BLOOD PRESSURE: 89 MMHG | TEMPERATURE: 98.3 F | HEIGHT: 68 IN | OXYGEN SATURATION: 99 % | BODY MASS INDEX: 47.74 KG/M2 | SYSTOLIC BLOOD PRESSURE: 166 MMHG | HEART RATE: 66 BPM | RESPIRATION RATE: 18 BRPM | WEIGHT: 315 LBS

## 2020-07-04 LAB — CK SERPL-CCNC: 1526 U/L (ref 16–300)

## 2020-07-04 PROCEDURE — 36415 COLL VENOUS BLD VENIPUNCTURE: CPT | Performed by: INTERNAL MEDICINE

## 2020-07-04 PROCEDURE — 25800000 HC PHARMACY IV SOLUTIONS: Performed by: HOSPITALIST

## 2020-07-04 PROCEDURE — 82550 ASSAY OF CK (CPK): CPT | Performed by: INTERNAL MEDICINE

## 2020-07-04 PROCEDURE — 99239 HOSP IP/OBS DSCHRG MGMT >30: CPT | Performed by: INTERNAL MEDICINE

## 2020-07-04 RX ADMIN — SODIUM CHLORIDE: 9 INJECTION, SOLUTION INTRAVENOUS at 05:58

## 2020-07-04 RX ADMIN — SODIUM CHLORIDE: 9 INJECTION, SOLUTION INTRAVENOUS at 00:45

## 2020-07-04 NOTE — NURSING NOTE
Patient discharged. Verbalizes understanding of instructions. IV removed, tele box returned to monitor room. Scrub top obtained from ER as pt's shirt soiled. All belongings including clothing and cell phone with patient at time of discharge.

## 2020-07-04 NOTE — DISCHARGE INSTRUCTIONS
Mr. Lyle,     [facility] with an episode of syncope which was a result of dehydration.  Your workup and treatment included a CAT scan, a consult to neurology, and IV fluids. You may experience symptoms of concussion (see information below) after falling.   Because of the dehydration, the CPK in your blood (muscle enzyme), was elevated resulting in a condition called rhabdomyolysis. This was treated with IV fluid and the number of CK is going down to normal levels.  Please stay well hydrated and drink plenty of fluids to make sure this muscle enzyme continues to decrease.     You will not be going home on any new medications.  Please stay hydrated.  We wish you the best of health in the future.    s  On your CAT scan of your abdomen/pelvis you were found to have inflammation of part of your colon. Please have this followed up with your primary care doctor to see if  You need further work up of this.     CAT scan of abdomen/pelvis  1. No acute posttraumatic abnormality in the chest, abdomen or pelvis.  2.  Lipomatous mural thickening of the ascending colon has developed since  11/22/2017 and may be sequela of interval infection/inflammation.        Concussion, Adult    A concussion is a brain injury from a direct hit (blow) to the head or body. This blow causes the brain to shake quickly back and forth inside the skull. This can damage brain cells and cause chemical changes in the brain. A concussion may also be known as a mild traumatic brain injury (TBI).  Concussions are usually not life-threatening, but the effects of a concussion can be serious. If you have a concussion, you should be very careful to avoid having a second concussion.  What are the causes?  This condition is caused by:  · A direct blow to your head, such as from running into another player during a game, being hit in a fight, or hitting your head on a hard surface.  · Sudden movement of your body that causes your brain to move  back and forth inside the skull, such as in a car crash.  What are the signs or symptoms?  The signs of a concussion can be hard to notice. Early on, they may be missed by you, family members, and health care providers. You may look fine on the outside, but may act or feel differently.  Symptoms are usually temporary, and for most adults, the symptoms improve in 7-10 days. Some symptoms may appear right away, but other symptoms may not show up for hours or days. If your symptoms last longer than normal, you may have post-concussion syndrome. Every head injury is different.  Physical symptoms  · Headaches. This can include a feeling of pressure in the head or migraine-like symptoms.  · Tiredness (fatigue).  · Dizziness.  · Problems with coordination or balance.  · Vision or hearing problems.  · Sensitivity to light or noise.  · Nausea or vomiting.  · Changes in eating or sleeping patterns.  · Numbness or tingling.  Mental and emotional symptoms  · Memory problems.  · Trouble concentrating, organizing, or making decisions.  · Slowness in thinking, acting or reacting, speaking, or reading.  · Irritability or mood changes.  · Anxiety or depression.  How is this diagnosed?  This condition is diagnosed based on:  · Your symptoms.  · A description of your injury.  You may also have tests, including:  · Imaging tests, such as a CT scan or MRI. These are done to look for signs of brain injury.  · Neuropsychological tests. These measure your thinking, understanding, learning, and remembering abilities.  How is this treated?  Treatment for this condition includes:  · Stopping sports or activity if you are injured. Anyone who hits their head or shows signs of a concussion should not return to sports or activities the same day, and they should not return until they are checked by a health care provider.  · Physical and mental rest and careful observation, usually at home. Gradually return to your normal activities.  · Medicine.  You may be prescribed medicines to help with symptoms such as headaches, nausea, or difficulty sleeping.  ? Avoid taking opioid pain medicine while recovering from a concussion.  · Avoiding alcohol and drugs. Alcohol and certain drugs may slow your recovery and can put you at risk of further injury.  · Referral to a concussion clinic or rehabilitation center.  How fast you will recover from a concussion depends on many factors. Recovery can take time. It is important to wait to return to activity until a health care provider says that it is safe and your symptoms are completely gone.  Follow these instructions at home:  Activity  · Limit activities that require a lot of thought or concentration, such as:  ? Doing homework or job-related work.  ? Watching TV.  ? Working on the computer.  ? Playing memory games and puzzles.  · Rest. Rest helps your brain heal. Make sure you:  ? Get plenty of sleep. Most adults should get 7-9 hours of sleep each night.  ? Rest during the day. Take naps or rest breaks when you feel tired.  · Do not do high-risk activities that could cause a second concussion, such as riding a bike or playing sports. Having another concussion before the first one has healed can be dangerous.  · Ask your health care provider when you can return to your normal activities, such as school, work, athletics, and driving. Your ability to react may be slower after a brain injury. Never do these activities if you are dizzy. Your health care provider will likely give you a plan for gradually returning to activities.  General instructions  · Take over-the-counter and prescription medicines only as told by your health care provider. Some medicines, such as blood thinners (anticoagulants) and aspirin, may increase the chance of complications, such as bleeding.  · Do not drink alcohol until your health care provider says you can.  · Watch your symptoms and tell others to do the same. Complications sometimes occur  after a concussion. Older adults with a brain injury may have a higher risk of serious complications.  · Tell your , teachers, school nurse, school counselor, , or  about your injury, symptoms, and restrictions. Tell them about what you can or cannot do. They should watch you for worsening symptoms.  · Keep all follow-up visits as told by your health care provider. This is important.  How is this prevented?  Avoiding another brain injury is very important, especially as you recover. In rare cases, another injury can lead to permanent brain damage, brain swelling, or death. The risk of this is greatest during the first 7-10 days after a head injury. Avoid injuries by:  · Avoiding activities that could lead to a second concussion, such as contact or recreational sports, until your health care provider says it is okay.  · Taking these actions once you have returned to sports or activities:  ? Avoiding plays or moves that can cause you to crash into another person. This is how most concussions occur.  ? Following the rules and being respectful of other players. Do not engage in violent or illegal plays.  · Getting regular exercise that includes strength and balance training.  Wear a properly fitting helmet during sports, biking, or other activities. Helmets can help protect you from serious skull and brain injuries, but they do not protect you from a concussion. Even when wearing a helmet, you should avoid being hit in the head.  Contact a health care provider if:  · Your symptoms get worse or they do not improve.  · You have new symptoms.  · You have another injury.  Get help right away if:  · You have severe or worsening headaches.  · You have weakness or numbness in any part of your body.  · You are confused.  · Your coordination gets worse.  · You vomit repeatedly.  · You are sleepier than normal.  · You have convulsions.  · Your speech is slurred.  · You cannot recognize people or  places.  · You have a seizure.  · It is difficult to wake you up.  · You have unusual behavior changes.  · You have changes in your vision.  · You lose consciousness.  Summary  · A concussion is a brain injury from a direct hit (blow) to your head or body.  · You may have imaging tests and neuropsychological tests to diagnose a concussion.  · Treatment for this condition includes physical and mental rest and careful observation.  · Ask your health care provider when you can return to your normal activities, such as school, work, athletics, and driving. Follow safety instructions as told by your health care provider.  This information is not intended to replace advice given to you by your health care provider. Make sure you discuss any questions you have with your health care provider.  Document Released: 03/09/2005 Document Revised: 01/24/2019 Document Reviewed: 01/24/2019  Elsevier Interactive Patient Education © 2019 [employer]      If you need to apply for funding to go to [facility] rehab, please call:  [facility] (you must have a [place] Photo ID)  [phone]

## 2020-07-04 NOTE — PLAN OF CARE
Problem: Adult Inpatient Plan of Care  Goal: Plan of Care Review  Outcome: Progressing  Flowsheets (Taken 7/4/2020 8775)  Progress: no change  Plan of Care Reviewed With: patient  Outcome Summary: Pt had no events overnight. Continuing with IVF NSS @ 200mL/hr. Labs pending this morning. Call bell within reach; will monitor.

## 2020-07-13 ENCOUNTER — APPOINTMENT (EMERGENCY)
Dept: RADIOLOGY | Facility: HOSPITAL | Age: 45
End: 2020-07-13
Attending: EMERGENCY MEDICINE
Payer: COMMERCIAL

## 2020-07-13 ENCOUNTER — HOSPITAL ENCOUNTER (EMERGENCY)
Facility: HOSPITAL | Age: 45
Discharge: HOME | End: 2020-07-13
Attending: EMERGENCY MEDICINE
Payer: COMMERCIAL

## 2020-07-13 VITALS
BODY MASS INDEX: 40.47 KG/M2 | DIASTOLIC BLOOD PRESSURE: 80 MMHG | WEIGHT: 267 LBS | TEMPERATURE: 97.7 F | HEIGHT: 68 IN | RESPIRATION RATE: 16 BRPM | HEART RATE: 97 BPM | SYSTOLIC BLOOD PRESSURE: 129 MMHG | OXYGEN SATURATION: 97 %

## 2020-07-13 DIAGNOSIS — T14.8XXA AVULSION FRACTURE: Primary | ICD-10-CM

## 2020-07-13 PROCEDURE — 99283 EMERGENCY DEPT VISIT LOW MDM: CPT | Mod: 25

## 2020-07-13 PROCEDURE — 73130 X-RAY EXAM OF HAND: CPT | Mod: RT

## 2020-07-13 ASSESSMENT — ENCOUNTER SYMPTOMS: EXTREMITY NUMBNESS: 0

## 2020-07-13 NOTE — DISCHARGE INSTRUCTIONS
Follow up with your primary care provider and with the hand specialist as needed. Continue to take ibuprofen and/or Tylenol as needed for pain. Wear the splint for comfort.
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
## 2020-08-26 ENCOUNTER — OFFICE VISIT (OUTPATIENT)
Dept: INTERNAL MEDICINE | Facility: HOSPITAL | Age: 45
End: 2020-08-26
Attending: STUDENT IN AN ORGANIZED HEALTH CARE EDUCATION/TRAINING PROGRAM
Payer: COMMERCIAL

## 2020-08-26 ENCOUNTER — APPOINTMENT (OUTPATIENT)
Dept: LAB | Facility: HOSPITAL | Age: 45
End: 2020-08-26
Attending: STUDENT IN AN ORGANIZED HEALTH CARE EDUCATION/TRAINING PROGRAM
Payer: COMMERCIAL

## 2020-08-26 VITALS
RESPIRATION RATE: 18 BRPM | WEIGHT: 275 LBS | DIASTOLIC BLOOD PRESSURE: 86 MMHG | TEMPERATURE: 98 F | HEART RATE: 80 BPM | BODY MASS INDEX: 41.68 KG/M2 | HEIGHT: 68 IN | OXYGEN SATURATION: 97 % | SYSTOLIC BLOOD PRESSURE: 141 MMHG

## 2020-08-26 DIAGNOSIS — J45.909 ASTHMA, UNSPECIFIED ASTHMA SEVERITY, UNSPECIFIED WHETHER COMPLICATED, UNSPECIFIED WHETHER PERSISTENT: Primary | ICD-10-CM

## 2020-08-26 DIAGNOSIS — F12.90 MARIJUANA USE: ICD-10-CM

## 2020-08-26 DIAGNOSIS — K63.9 MURAL THICKENING OF COLON: ICD-10-CM

## 2020-08-26 DIAGNOSIS — I10 HYPERTENSION, UNSPECIFIED TYPE: ICD-10-CM

## 2020-08-26 PROBLEM — E87.20 METABOLIC ACIDOSIS: Status: RESOLVED | Noted: 2020-06-30 | Resolved: 2020-08-26

## 2020-08-26 PROBLEM — N17.9 AKI (ACUTE KIDNEY INJURY) (CMS/HCC): Status: RESOLVED | Noted: 2020-06-30 | Resolved: 2020-08-26

## 2020-08-26 PROBLEM — R93.5 ABNORMAL ABDOMINAL CT SCAN: Status: RESOLVED | Noted: 2020-07-01 | Resolved: 2020-08-26

## 2020-08-26 PROBLEM — M62.82 NON-TRAUMATIC RHABDOMYOLYSIS: Status: RESOLVED | Noted: 2020-07-01 | Resolved: 2020-08-26

## 2020-08-26 PROBLEM — E87.20 LACTIC ACIDOSIS: Status: RESOLVED | Noted: 2020-06-30 | Resolved: 2020-08-26

## 2020-08-26 PROBLEM — R41.82 ALTERED MENTAL STATUS: Status: RESOLVED | Noted: 2020-06-30 | Resolved: 2020-08-26

## 2020-08-26 PROBLEM — R40.20 LOSS OF CONSCIOUSNESS (CMS/HCC): Status: RESOLVED | Noted: 2020-06-30 | Resolved: 2020-08-26

## 2020-08-26 LAB
BASOPHILS # BLD: 0.03 K/UL (ref 0.01–0.1)
BASOPHILS NFR BLD: 0.5 %
CHOLEST SERPL-MCNC: 197 MG/DL
DIFFERENTIAL METHOD BLD: NORMAL
EOSINOPHIL # BLD: 0.15 K/UL (ref 0.04–0.54)
EOSINOPHIL NFR BLD: 2.4 %
ERYTHROCYTE [DISTWIDTH] IN BLOOD BY AUTOMATED COUNT: 12.8 % (ref 11.6–14.4)
EST. AVERAGE GLUCOSE BLD GHB EST-MCNC: 123 MG/DL
HBA1C MFR BLD HPLC: 5.9 %
HCT VFR BLDCO AUTO: 50 % (ref 40.1–51)
HDLC SERPL-MCNC: 47 MG/DL
HDLC SERPL: 4.2 {RATIO}
HGB BLD-MCNC: 16.3 G/DL (ref 13.7–17.5)
IMM GRANULOCYTES # BLD AUTO: 0.01 K/UL (ref 0–0.08)
IMM GRANULOCYTES NFR BLD AUTO: 0.2 %
LDLC SERPL CALC-MCNC: 137 MG/DL
LYMPHOCYTES # BLD: 2.11 K/UL (ref 1.2–3.5)
LYMPHOCYTES NFR BLD: 33.8 %
MCH RBC QN AUTO: 30.4 PG (ref 28–33.2)
MCHC RBC AUTO-ENTMCNC: 32.6 G/DL (ref 32.2–36.5)
MCV RBC AUTO: 93.1 FL (ref 83–98)
MONOCYTES # BLD: 0.41 K/UL (ref 0.3–1)
MONOCYTES NFR BLD: 6.6 %
NEUTROPHILS # BLD: 3.54 K/UL (ref 1.7–7)
NEUTS SEG NFR BLD: 56.5 %
NONHDLC SERPL-MCNC: 150 MG/DL
NRBC BLD-RTO: 0 %
PDW BLD AUTO: 9.6 FL (ref 9.4–12.4)
PLATELET # BLD AUTO: 283 K/UL (ref 150–350)
RBC # BLD AUTO: 5.37 M/UL (ref 4.5–5.8)
TRIGL SERPL-MCNC: 64 MG/DL (ref 30–149)
WBC # BLD AUTO: 6.25 K/UL (ref 3.8–10.5)

## 2020-08-26 PROCEDURE — 83036 HEMOGLOBIN GLYCOSYLATED A1C: CPT

## 2020-08-26 PROCEDURE — 99205 OFFICE O/P NEW HI 60 MIN: CPT | Performed by: STUDENT IN AN ORGANIZED HEALTH CARE EDUCATION/TRAINING PROGRAM

## 2020-08-26 PROCEDURE — 85025 COMPLETE CBC W/AUTO DIFF WBC: CPT

## 2020-08-26 PROCEDURE — 80061 LIPID PANEL: CPT

## 2020-08-26 PROCEDURE — 36415 COLL VENOUS BLD VENIPUNCTURE: CPT

## 2020-08-26 RX ORDER — POLYETHYLENE GLYCOL 3350 17 G/17G
17 POWDER, FOR SOLUTION ORAL DAILY
Qty: 510 G | Refills: 5 | Status: SHIPPED | OUTPATIENT
Start: 2020-08-26 | End: 2020-11-09

## 2020-08-26 RX ORDER — ALBUTEROL SULFATE 90 UG/1
2 INHALANT RESPIRATORY (INHALATION) EVERY 6 HOURS PRN
Qty: 1 INHALER | Refills: 1 | Status: SHIPPED | OUTPATIENT
Start: 2020-08-26 | End: 2020-11-09

## 2020-08-26 ASSESSMENT — ENCOUNTER SYMPTOMS
PHOTOPHOBIA: 0
BACK PAIN: 0
PALPITATIONS: 0
CONSTIPATION: 0
ABDOMINAL PAIN: 1
DIZZINESS: 0
RHINORRHEA: 0
CHILLS: 0
AGITATION: 0
CHEST TIGHTNESS: 0
DIFFICULTY URINATING: 0
VOMITING: 0
ARTHRALGIAS: 0
ABDOMINAL DISTENTION: 0
SHORTNESS OF BREATH: 0
NAUSEA: 0
DYSURIA: 0
ANAL BLEEDING: 0
DIARRHEA: 0
HEADACHES: 0
FEVER: 0
COLOR CHANGE: 0
COUGH: 0
BLOOD IN STOOL: 1

## 2020-08-26 NOTE — PROGRESS NOTES
[facility]  Internal Medicine Progress Note       SUBJECTIVE   Fernando Lyle is a 45 y.o. year-old male with a past medical history of asthma and was recently admitted to the hospital after a loss of consciousness.  Pt was evaluated by Neurology who suspected that LOC was likely related to syncope.  At that time patient was treated for rhabdo and JAIMIE with fluids.  UDS at time of hospitalization positive for cannabis and amphetamines.  Pt also recently see in the ED about 1 month ago for right thumb pain.  Xray revealed no fracture at that time.  Today patient presents for follow up regarding ascending colon thickening seen on CT imaging during a previous admission.  At this time patient states taht he has been experiencing some lower abdominal pain that comes and goes and is mostly noticed in the morning after waking up and is after drinking water and coffee.  He states that he sometimes has seen dark stools and blood in stools but states that this is not associated with pain and german snot wake him up from sleep.  He denies any constipation or straining and denies any diarrhea.  He states that the pain he gets is sharp and comes and goes in waves but denies any epigastric pain or association with foods.    Orthostasis: Recently admitted to [facility] at the end of June for a LOC episode.  Pt evaluated by Neurology thought to be secondary to orthostasis; UDS positive fr cannabinoids and amphetamines.  Treated for rhabdo and JAIMIE with IVF.  He states that since hospitalization he has not had any further episodes of LOC; he states that this episode was related to taking ecstasy for the first time which he has not taken anymore.    Right Thumb Pain:  Had pain since LOC episode and admission but presented 3 weeks later to ED for persistent pain.  Xray did not reveal any acute fracture , dislocation, or abnormality.  He states that this pain has now since resolved.    Mural Thickening of Ascending Colon:  "Seen on CT imaging from recent admission in June.  It was recommended that patient obtain outpatient GI follow up for possible.  He is presenting today for follow up from recent admission in order to obtain a referral for GI evaluation for colonoscopy.  He does note some intermittent abdominal pain and blood in stools which he says is dark but is unpredictable as to when it will appear.  Hemoglobin from previous admissions was normal at 14.0.    Marijuana Use: Pt does admit to using marijuana use frequently for recreational use; states that he had used amphetamines and ecstasy but has stopped since recent hospitalization.     REVIEW OF SYSTEMS   Review of Systems   Constitutional: Negative for chills and fever.   HENT: Negative for congestion, postnasal drip and rhinorrhea.    Eyes: Negative for photophobia and visual disturbance.   Respiratory: Negative for cough, chest tightness and shortness of breath.    Cardiovascular: Negative for chest pain and palpitations.   Gastrointestinal: Positive for abdominal pain and blood in stool. Negative for abdominal distention, anal bleeding, constipation, diarrhea, nausea and vomiting.   Genitourinary: Negative for difficulty urinating and dysuria.   Musculoskeletal: Negative for arthralgias and back pain.   Skin: Negative for color change and pallor.   Neurological: Negative for dizziness and headaches.   Psychiatric/Behavioral: Negative for agitation and behavioral problems.        MEDICATIONS        Current Outpatient Medications:   •  albuterol HFA (VENTOLIN HFA) 90 mcg/actuation inhaler, Inhale 2 puffs every 6 (six) hours as needed for wheezing., Disp: 1 Inhaler, Rfl: 1  •  polyethylene glycol (MIRALAX) 17 gram/dose powder, Take 17 g by mouth daily., Disp: 510 g, Rfl: 5    PHYSICAL EXAMINATION   Vital signs:   Visit Vitals  BP (!) 141/86   Pulse 80   Temp 36.7 °C (98 °F) (Oral)   Resp 18   Ht 1.727 m (5' 8\")   Wt 125 kg (275 lb)   SpO2 97%   BMI 41.81 kg/m²     Physical " Exam   Constitutional: He is oriented to person, place, and time. He appears well-developed and well-nourished. No distress.   Pt with large body habitus   HENT:   Head: Normocephalic and atraumatic.   Mouth/Throat: Oropharynx is clear and moist.   Eyes: Pupils are equal, round, and reactive to light. EOM are normal. No scleral icterus.   Neck: Normal range of motion. Neck supple. No JVD present.   Cardiovascular: Normal rate and regular rhythm. Exam reveals no friction rub.   No murmur heard.  Pulmonary/Chest: Effort normal. No respiratory distress.   Abdominal: Soft. He exhibits no distension. There is no tenderness. There is no rebound and no guarding.   Musculoskeletal: Normal range of motion.   Neurological: He is alert and oriented to person, place, and time. No cranial nerve deficit. Coordination normal.   Skin: Skin is warm and dry. Capillary refill takes less than 2 seconds.        LABS / IMAGING/STUDIES      Labs Reviewed:   Last BMP:  Lab Results   Component Value Date     07/03/2020    K 3.6 07/03/2020     07/03/2020    CO2 23 07/03/2020    BUN 6 (L) 07/03/2020    CREATININE 1.3 07/03/2020       Last CBC:  Lab Results   Component Value Date    WBC 6.55 07/02/2020    HGB 14.0 07/02/2020    HCT 42.3 07/02/2020    MCV 92.8 07/02/2020     07/02/2020       Last Lipids:  No results found for: CHOL, HDL, LDLCALC, TRIG    Last three HgbA1c:  No results found for: HGBA1C    Last Microalbumin:  No results found for: MICROALBUR    Last TSH:  No results found for: TSH    Last Vitamin D:  No results found for: FHBI11ZX    Studies/Imaging Reviewed: From previous admission reviewed        ASSESSMENT AND PLAN      Problem List Items Addressed This Visit        Respiratory    Asthma - Primary     Chronic medical condition; uses infrequently    - Will refill Albuterol inhaler prescription         Relevant Medications    albuterol HFA (VENTOLIN HFA) 90 mcg/actuation inhaler       Circulatory     Hypertension     Pt with BP of 141/86 in the office    - Will obtain lipid panel and Hgb A1c  - Will have patient follow up in 2 months for review of blood work and determine need for medication         Relevant Orders    Hemoglobin A1c    Lipid panel       Digestive    Mural thickening of colon     Seen on CT imaging during recent hospitalization; admits to associated abdominal pain and some blood in stools which apparently has bene ongoing for over a year.  Possibly recent substance abuse could be contributing to findings given amphetamines seen in UDS.    - Will consult GI for possible colonoscopy  - Will provide Miralax to supplement bowel function  - Ordering CBC to make sure that patient is not becoming anemic with blood in stools; most recent Hgb of 14 during last admission in June-July         Relevant Medications    polyethylene glycol (MIRALAX) 17 gram/dose powder    Other Relevant Orders    Ambulatory referral to [facility]    CBC and differential       Other    Marijuana use     Pt notes uses for recreational use; has since stopped amphetamines and ecstasy since recnet hospitalization.    - Discussed and counseled on illicit use vs abuse.                HEALTHCARE MAINTENANCE   Health Maintenance Due   Topic Date Due   • Varicella Vaccines (1 of 2 - 2-dose childhood series) 05/01/1977   • Pneumococcal (1 of 1 - PPSV23) 05/01/1982   • HIV Screening  05/01/1989   • Influenza Vaccine (1) 08/01/2020          Sergei Tolentino DO  08/26/20  10:37 AM

## 2020-08-26 NOTE — ASSESSMENT & PLAN NOTE
Pt with BP of 141/86 in the office    - Will obtain lipid panel and Hgb A1c  - Will have patient follow up in 2 months for review of blood work and determine need for medication

## 2020-08-26 NOTE — PROGRESS NOTES
[facility]  Internal Medicine Progress Note       BRIEF ATTENDING DOCUMENTATION   Please see attestation section of note for attestation and any additional physician documentation not found here.    Type of faculty precepting: Seen    HPI: This is a 45 year old male with PMH of Asthma who presents after recent hospitalization for syncope found to have JAIMIE and Rhabdo related to MDMA use. He additionally had a CT abdomen for an elevated lactate which showed some colonic thickening.     He notes abdominal pain that never wakes him up, usually first thing in the morning after drinking water or coffee, cramping pain 10 minutes then goes away. Not associated with stools, though he notes that his stool is loose and dark.       AP  Asthma-continue inhaler therapy    Abdominal pain- will treat for IBS-C with overflow with mirlax trial, will need GI referral given imaging findings, trend hgb given dark stools.     No insurance-  referral, MA pending.     Sergei Villela DO

## 2020-08-26 NOTE — PATIENT INSTRUCTIONS
You presented to the office today after a recent hospitalization where imaging showed some thickening of your colon along with a history of some bloody bowel movements.    - You will need to follow up with Gastroenterology in order to schedule an appointment for a colonoscopy in order to evaluate this are seen on CT imaging.  - We have provided you with a prescription with Miralax in order in order to help you to move your bowels in an attempt to help alleviate some of your abdominal pain.  - You will also need to go to the [facility] lab for blood work to make sure that you are not anemia and to assess for elevated cholesterol and for a diabetes screening test  - Given this blood work can help us to evaluate your blood pressure which was a little elevated in the office  - We will have you follow up in 2 months t go over your blood work and to reassess your blood pressure to determine if any medications are needed to treat this blood pressure  - You should also continue with the application process for MA; please feel free to reach out to the office talita Powers should any further questions arise.

## 2020-08-26 NOTE — ASSESSMENT & PLAN NOTE
Pt notes uses for recreational use; has since stopped amphetamines and ecstasy since recnet hospitalization.    - Discussed and counseled on illicit use vs abuse.

## 2020-08-26 NOTE — ASSESSMENT & PLAN NOTE
Seen on CT imaging during recent hospitalization; admits to associated abdominal pain and some blood in stools which apparently has bene ongoing for over a year.  Possibly recent substance abuse could be contributing to findings given amphetamines seen in UDS.    - Will consult GI for possible colonoscopy  - Will provide Miralax to supplement bowel function  - Ordering CBC to make sure that patient is not becoming anemic with blood in stools; most recent Hgb of 14 during last admission in June-July

## 2020-08-28 ENCOUNTER — PATIENT OUTREACH (OUTPATIENT)
Dept: CASE MANAGEMENT | Facility: CLINIC | Age: 45
End: 2020-08-28

## 2020-08-28 NOTE — PROGRESS NOTES
SW received request from Dr. Villela to assist pt with submitting his MA sarahi/obtaining insurance.    Patient is a 46YO w/ dx that include asthma, JAIMIE, rhabdo related to MDMA use. He is prescribed an albuterol inhaler, which this SW is told he currently has and is not in need of. Pt does not have insurance. He's supposed to be working with [facility] [employer] to obtain. Physician's are recommending f/u w/ GI and colonoscopy.    ZACK left pt a VM.      Nathalie aPtel, LSW  [phone]

## 2020-08-31 NOTE — PROGRESS NOTES
ZACK spoke with pt regarding insurance application. He says that he is working w/ [employer], Priya Cartagena, on medical assistance application. He confirms he submitted an application and provided her with supporting financial and proof of residency documents. ZACK sent message to Clement to determine if there's anything else needed.   ZACK gave pt phone number to [facility] to schedule GI appt.            Nathalie Patel, TYRONE  [phone]

## 2020-09-04 ENCOUNTER — TELEPHONE (OUTPATIENT)
Dept: INTERNAL MEDICINE | Facility: HOSPITAL | Age: 45
End: 2020-09-04

## 2020-09-04 NOTE — TELEPHONE ENCOUNTER
Called the patient with the results of his recent blood work.  Discussed a follow up appointment in 6 months.  We talked about his current diet and adjustments he could make with regards to his borderline LDL and Hgb A1c.  He was very amenable to making some changes in his current lifestyle.  I continued to encourage him to make some adjustments in diet and physical activity and the possibility of assessing blood work again in follow up in 6 months to assess if medication will be necessary or if we can continue to monitor his blood work for now.  He does not that he has struggled to get a hold of someone with regards to his colonoscopy.  Thus I I discussed with him that the holiday may be affecting this but if he continues to be unable to get a hold of someone beyond of next week to call the [facility] office and let me know so that it can be investigated further.

## 2020-09-14 ENCOUNTER — PATIENT OUTREACH (OUTPATIENT)
Dept: CASE MANAGEMENT | Facility: CLINIC | Age: 45
End: 2020-09-14

## 2020-09-14 NOTE — PROGRESS NOTES
SW informed by [facility] [employer] that pt makes over the income limit for medical assistance. ($704/week), as well as MAWD. SW attempt to discuss leonela care with pt as well as encourage him to apply for marketplace insurance during open enrollment Nov 1.   PCP requesting pt have GI f/u and colonoscopy. Pt uninsured and will need to private pay or apply for leonela care.  Left VM    ADDENDUM  SW spoke to pt about MA income limits. Pt says that as of July 25th, his income from  is $170/week. He has not be receiving additional income elsewhere. He was previously working w/ [employer], Clement Cartagena, and she has his proof of residency, etc. SW advised that he reach back out to her and request to reapply since his income has changed. ZACK notified Financial team as well      Nathalie Patel, TYRONE  [phone]

## 2020-09-15 NOTE — PROGRESS NOTES
ZACK received message from [facility] that pt would be referred to PATHS to apply for MA. Recommended that pt apply for [employer] for [place] stay in 2019. ZACK spoke to pt and discussed the above and supporting documentation needed. Pt to come in today.    ZACK met with pt and completed [employer] application. Printed out his  Benefits Statement, made copies of proof of residency. Brought pt to [facility] to complete MA sarahi. Pt still needs to bring PATHS a copy of a utility bill. He is currently living with his aunt. Pt also has a minor child that he pays child support, but does not claim as a dependent. He's actively looking for employment, but has been out of work d/t COVID since March. Discussed needing insurance for medical treatment and medicine.    ZACK submitted [employer] sarahi and  benefits statement via fax. Will assist pt with process for [employer] and monitor ability to obtain MA.

## 2020-09-17 NOTE — PROGRESS NOTES
9/17  [facility] that they require patient's 2019 Federal Tax return.    SW spoke to pt to inform. Reviewed that [facility] is to assist with his early 2019 hospital bills. MA only back dates 1 year. Explained process for MA and 30-45 day wait time. Pt understands. He'll look for his tax return and provide to this SW to submit for [facility].

## 2020-11-06 ENCOUNTER — PATIENT OUTREACH (OUTPATIENT)
Dept: CASE MANAGEMENT | Facility: CLINIC | Age: 45
End: 2020-11-06

## 2020-11-06 NOTE — PROGRESS NOTES
Per PROMISE, pt approved and active for medical assistance.     ZACK called pt who was unaware and didn't receive a letter in mail from [facility]. Discussed [facility] accepted MCO's. Attempted to assist pt with online pa enrollment of MCO, but site not working. Will attempt again or advise pt to call.     Per chart, pt missed 10/29 appt w/ GI. He has PCP appt scheduled for 12/8. Transferred pt to [facility]SR who rescheduled GI for 1/21.   PCP copied on this note should he feel pt needs earlier GI appt. ZACK can assist with referrals to other providers if needed.      Nathalie Patel, TYRONE  [phone]

## 2020-11-09 ENCOUNTER — HOSPITAL ENCOUNTER (EMERGENCY)
Facility: HOSPITAL | Age: 45
Discharge: HOME | End: 2020-11-09
Attending: EMERGENCY MEDICINE
Payer: OTHER MISCELLANEOUS

## 2020-11-09 ENCOUNTER — APPOINTMENT (EMERGENCY)
Dept: RADIOLOGY | Facility: HOSPITAL | Age: 45
End: 2020-11-09
Payer: OTHER MISCELLANEOUS

## 2020-11-09 VITALS
OXYGEN SATURATION: 96 % | HEIGHT: 68 IN | HEART RATE: 84 BPM | RESPIRATION RATE: 16 BRPM | WEIGHT: 217 LBS | BODY MASS INDEX: 32.89 KG/M2 | SYSTOLIC BLOOD PRESSURE: 120 MMHG | TEMPERATURE: 98.1 F | DIASTOLIC BLOOD PRESSURE: 70 MMHG

## 2020-11-09 DIAGNOSIS — S60.10XA SUBUNGUAL HEMATOMA OF FINGER, INITIAL ENCOUNTER: Primary | ICD-10-CM

## 2020-11-09 PROCEDURE — 73140 X-RAY EXAM OF FINGER(S): CPT | Mod: LT

## 2020-11-09 PROCEDURE — 0HCQXZZ EXTIRPATION OF MATTER FROM FINGER NAIL, EXTERNAL APPROACH: ICD-10-PCS | Performed by: EMERGENCY MEDICINE

## 2020-11-09 PROCEDURE — 99283 EMERGENCY DEPT VISIT LOW MDM: CPT | Mod: 25

## 2020-11-09 PROCEDURE — 11740 EVACUATION SUBUNGUAL HMTMA: CPT | Mod: F2

## 2020-11-09 ASSESSMENT — ENCOUNTER SYMPTOMS
COLOR CHANGE: 1
NUMBNESS: 0
WEAKNESS: 0
JOINT SWELLING: 0
ARTHRALGIAS: 0

## 2020-11-10 ENCOUNTER — TELEPHONE (OUTPATIENT)
Dept: PRIMARY CARE | Facility: CLINIC | Age: 45
End: 2020-11-10

## 2020-11-10 NOTE — ED ATTESTATION NOTE
I reviewed and agree with physician assistant / nurse practitioner’s assessment and plan of care.     X-ray reviewed no fracture    Patient was here for subungual hematoma that was trephinated by the physician's assistant.           Abiel Hernandes DO  11/09/20 1940
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
## 2020-12-10 ENCOUNTER — HOSPITAL ENCOUNTER (EMERGENCY)
Facility: HOSPITAL | Age: 45
Discharge: HOME | End: 2020-12-10
Attending: EMERGENCY MEDICINE
Payer: COMMERCIAL

## 2020-12-10 VITALS
DIASTOLIC BLOOD PRESSURE: 89 MMHG | HEIGHT: 67 IN | RESPIRATION RATE: 16 BRPM | WEIGHT: 254 LBS | OXYGEN SATURATION: 99 % | HEART RATE: 75 BPM | TEMPERATURE: 98.4 F | SYSTOLIC BLOOD PRESSURE: 137 MMHG | BODY MASS INDEX: 39.87 KG/M2

## 2020-12-10 DIAGNOSIS — K52.9 GASTROENTERITIS: ICD-10-CM

## 2020-12-10 DIAGNOSIS — R19.7 DIARRHEA, UNSPECIFIED TYPE: Primary | ICD-10-CM

## 2020-12-10 LAB
ALBUMIN SERPL-MCNC: 4.6 G/DL (ref 3.4–5)
ALP SERPL-CCNC: 57 IU/L (ref 35–126)
ALT SERPL-CCNC: 44 IU/L (ref 16–63)
ANION GAP SERPL CALC-SCNC: 12 MEQ/L (ref 3–15)
AST SERPL-CCNC: 41 IU/L (ref 15–41)
BASOPHILS # BLD: 0.04 K/UL (ref 0.01–0.1)
BASOPHILS NFR BLD: 0.5 %
BILIRUB SERPL-MCNC: 0.8 MG/DL (ref 0.3–1.2)
BUN SERPL-MCNC: 17 MG/DL (ref 8–20)
CALCIUM SERPL-MCNC: 8.9 MG/DL (ref 8.9–10.3)
CHLORIDE SERPL-SCNC: 101 MEQ/L (ref 98–109)
CO2 SERPL-SCNC: 24 MEQ/L (ref 22–32)
CREAT SERPL-MCNC: 1.2 MG/DL (ref 0.8–1.3)
DIFFERENTIAL METHOD BLD: NORMAL
EOSINOPHIL # BLD: 0.1 K/UL (ref 0.04–0.54)
EOSINOPHIL NFR BLD: 1.3 %
ERYTHROCYTE [DISTWIDTH] IN BLOOD BY AUTOMATED COUNT: 12.9 % (ref 11.6–14.4)
GFR SERPL CREATININE-BSD FRML MDRD: >60 ML/MIN/1.73M*2
GLUCOSE SERPL-MCNC: 90 MG/DL (ref 70–99)
HCT VFR BLDCO AUTO: 47.7 % (ref 40.1–51)
HGB BLD-MCNC: 15.5 G/DL (ref 13.7–17.5)
IMM GRANULOCYTES # BLD AUTO: 0.01 K/UL (ref 0–0.08)
IMM GRANULOCYTES NFR BLD AUTO: 0.1 %
LYMPHOCYTES # BLD: 2.57 K/UL (ref 1.2–3.5)
LYMPHOCYTES NFR BLD: 33.1 %
MCH RBC QN AUTO: 30.7 PG (ref 28–33.2)
MCHC RBC AUTO-ENTMCNC: 32.5 G/DL (ref 32.2–36.5)
MCV RBC AUTO: 94.5 FL (ref 83–98)
MONOCYTES # BLD: 0.53 K/UL (ref 0.3–1)
MONOCYTES NFR BLD: 6.8 %
NEUTROPHILS # BLD: 4.52 K/UL (ref 1.7–7)
NEUTS SEG NFR BLD: 58.2 %
NRBC BLD-RTO: 0 %
PDW BLD AUTO: 9.4 FL (ref 9.4–12.4)
PLATELET # BLD AUTO: 278 K/UL (ref 150–350)
POTASSIUM SERPL-SCNC: 3.7 MEQ/L (ref 3.6–5.1)
PROT SERPL-MCNC: 7.9 G/DL (ref 6–8.2)
RBC # BLD AUTO: 5.05 M/UL (ref 4.5–5.8)
SODIUM SERPL-SCNC: 137 MEQ/L (ref 136–144)
WBC # BLD AUTO: 7.77 K/UL (ref 3.8–10.5)

## 2020-12-10 PROCEDURE — 80053 COMPREHEN METABOLIC PANEL: CPT | Performed by: EMERGENCY MEDICINE

## 2020-12-10 PROCEDURE — 85025 COMPLETE CBC W/AUTO DIFF WBC: CPT

## 2020-12-10 PROCEDURE — 99283 EMERGENCY DEPT VISIT LOW MDM: CPT | Mod: 25

## 2020-12-10 PROCEDURE — 85025 COMPLETE CBC W/AUTO DIFF WBC: CPT | Performed by: EMERGENCY MEDICINE

## 2020-12-10 PROCEDURE — 36415 COLL VENOUS BLD VENIPUNCTURE: CPT

## 2020-12-10 PROCEDURE — 80053 COMPREHEN METABOLIC PANEL: CPT

## 2020-12-10 ASSESSMENT — ENCOUNTER SYMPTOMS
BLOOD IN STOOL: 0
ARTHRALGIAS: 0
BACK PAIN: 0
PALPITATIONS: 0
SHORTNESS OF BREATH: 0
COLOR CHANGE: 0
DIARRHEA: 1
HEMATURIA: 0
FEVER: 0
ABDOMINAL PAIN: 1
EYE PAIN: 0
VOMITING: 0
NAUSEA: 0
DYSURIA: 0
COUGH: 0
SORE THROAT: 0
SEIZURES: 0
CHILLS: 1

## 2020-12-10 NOTE — DISCHARGE INSTRUCTIONS
You were treated in the ER today for diarrhea for the last few days.  As discussed, your blood work that was unremarkable for any significant infection or emergent condition at this time.    Hydrate well with lots of oral fluids, please note the food choices description printed out with your discharge papers and need a simple diet for the next few days as tolerated.  Progressed to more complex foods after a few days of solid stools.    Follow-up with your PCP to 3 days let them know about your visit to the ER today.    Follow-up with your GI doctor as needed.    Return to the ER for signs and symptoms including but not limited to return of abdominal pain, bloody or black diarrhea, nausea or vomiting, fever chills, shaking, chest pain or difficulty breathing, any other medical concerns or complaints.

## 2020-12-10 NOTE — Clinical Note
Fernando Lyle was seen and treated in our emergency department on 12/10/2020.  He may return to work on 12/14/2020.  May return sooner if better.     If you have any questions or concerns, please don't hesitate to call.      Noreen Tolentino PA C

## 2020-12-10 NOTE — ED ATTESTATION NOTE
The patient was evaluated and managed by the physician assistant / nurse practitioner.  45-year-old male comes emergent complaining of diarrhea.  All signs are unremarkable.  Laboratory studies are unremarkable.  Agree with the findings and plan.     Musa Isidro MD  12/10/20 0955

## 2020-12-11 ENCOUNTER — APPOINTMENT (OUTPATIENT)
Dept: LAB | Facility: HOSPITAL | Age: 45
End: 2020-12-11
Attending: STUDENT IN AN ORGANIZED HEALTH CARE EDUCATION/TRAINING PROGRAM
Payer: COMMERCIAL

## 2020-12-11 ENCOUNTER — OFFICE VISIT (OUTPATIENT)
Dept: INTERNAL MEDICINE | Facility: HOSPITAL | Age: 45
End: 2020-12-11
Attending: STUDENT IN AN ORGANIZED HEALTH CARE EDUCATION/TRAINING PROGRAM
Payer: COMMERCIAL

## 2020-12-11 VITALS
SYSTOLIC BLOOD PRESSURE: 108 MMHG | OXYGEN SATURATION: 95 % | BODY MASS INDEX: 38.8 KG/M2 | DIASTOLIC BLOOD PRESSURE: 70 MMHG | WEIGHT: 256 LBS | HEIGHT: 68 IN | HEART RATE: 94 BPM | TEMPERATURE: 98.2 F | RESPIRATION RATE: 18 BRPM

## 2020-12-11 DIAGNOSIS — K63.9 MURAL THICKENING OF COLON: Primary | ICD-10-CM

## 2020-12-11 DIAGNOSIS — R73.03 PREDIABETES: ICD-10-CM

## 2020-12-11 DIAGNOSIS — Z20.2 POSSIBLE EXPOSURE TO STD: ICD-10-CM

## 2020-12-11 DIAGNOSIS — H93.92 LESION OF LEFT EAR: ICD-10-CM

## 2020-12-11 DIAGNOSIS — J45.909 ASTHMA, UNSPECIFIED ASTHMA SEVERITY, UNSPECIFIED WHETHER COMPLICATED, UNSPECIFIED WHETHER PERSISTENT: ICD-10-CM

## 2020-12-11 DIAGNOSIS — R19.7 DIARRHEA, UNSPECIFIED TYPE: ICD-10-CM

## 2020-12-11 DIAGNOSIS — Z20.2 CONTACT WITH AND (SUSPECTED) EXPOSURE TO INFECTIONS WITH A PREDOMINANTLY SEXUAL MODE OF TRANSMISSION: ICD-10-CM

## 2020-12-11 DIAGNOSIS — I10 HYPERTENSION, UNSPECIFIED TYPE: ICD-10-CM

## 2020-12-11 PROCEDURE — 99214 OFFICE O/P EST MOD 30 MIN: CPT | Mod: GC | Performed by: STUDENT IN AN ORGANIZED HEALTH CARE EDUCATION/TRAINING PROGRAM

## 2020-12-11 ASSESSMENT — ENCOUNTER SYMPTOMS
NUMBNESS: 0
BRUISES/BLEEDS EASILY: 0
DIARRHEA: 1
WEAKNESS: 0
ARTHRALGIAS: 0
CHILLS: 0
VOMITING: 0
DIFFICULTY URINATING: 0
AGITATION: 0
PHOTOPHOBIA: 0
DYSURIA: 0
SEIZURES: 0
BACK PAIN: 0
POLYPHAGIA: 0
NAUSEA: 0
DIZZINESS: 0
COUGH: 0
ADENOPATHY: 0
FEVER: 0
PALPITATIONS: 0
SHORTNESS OF BREATH: 0
FREQUENCY: 0

## 2020-12-11 ASSESSMENT — PATIENT HEALTH QUESTIONNAIRE - PHQ9: SUM OF ALL RESPONSES TO PHQ9 QUESTIONS 1 & 2: 0
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
## 2020-12-11 NOTE — ASSESSMENT & PLAN NOTE
Seen on CT imaging during recent hospitalization; admits to associated abdominal pain and some blood in stools which apparently has been ongoing for over a year.  Possibly recent substance abuse could be contributing to findings given amphetamines seen in UDS.  Was provided with referral for colonoscopy with GI and Miralax to supplement bowl function as he had relayed some straining.  Had been having some mild blood on toilet paper and in stools; Hgb has been stable.     - Encouraged to follow up with GI for colonoscopy; follow up scheduled for January  - Miralax as needed to supplement BMs

## 2020-12-11 NOTE — ASSESSMENT & PLAN NOTE
Pt concerned about possible STD exposure given promiscuous and non protected sexual encounters in the past    - Ordered HIV, Hep C, GC, Chlamydia, and RPR

## 2020-12-11 NOTE — ASSESSMENT & PLAN NOTE
Complaining of left ear lesion and cyst.  States had an outbreak or so on lip about a year and half ago and was given cream and did not come back.  States it has been coming and ongong every couple of months.  States that it was like a little pimple and then it would pop and crust over.  Notes that when the pimple starts to form it is very tender.  No ringing of ears and hearing is fine.  Stated that it was previously around his upper ear.   States that he is in a relationship now but prior to that 2 years ago he states that he did have some unprotected sexual encounters.  Was told at [facility] it was a cyst and was scheduled at [place] to have it removed but then the pandemic came around and he pushed this appointment off.  Would like to be screened for STIs.    - Pt instructed to return should this lesion present in its early form  - Holding off on treating for herpes as lesion is singular and would be uncommon on ear; patietn realying pus drainage; sounds more like pimple or cyst as described as solitary lesion

## 2020-12-11 NOTE — PROGRESS NOTES
Here for lesion on L ear, has had other similar lesions on upper part of ear and near nasolabial fold before.  Says it did pop and drain some stuff before.    Vitals stable  Erythematous area on the superior aspect of the inferior pinna without crusting/scabbing  1. Ear lesion:  Seems likely to be pimple/clogged pore and doubt herpetic lesion though we are seeing late in course.  Would be unusual location and drainage by history sounds atypical.  If lesions return advised he come earlier in course to fully evaluate.

## 2020-12-11 NOTE — PROGRESS NOTES
[facility]  Internal Medicine Progress Note       SUBJECTIVE   Fernando Lyle is a 45 y.o. year-old male with a past medical history of asthma and colon wall mural thickening who presents for left ear lesion.  He states that he is now insured and working at the [employer].    At last visit routine blood work obtained with A1c of 5.9, no further intervention or medications prescribed expect Miralax to supplement bowel function and treat IBS-C; provided with referral for colonoscopy with GI for mural thickening of the colon.  Missed GI appointment on 10/29 and was rescheduled for 1/21.    Interval History: Pt seen in the ED on 12/10 for diarrhea of 5 days duration; discharged with concerns for gastroenteritis, blood work was WNL.  Context of possible questionable Mexican food.  1 to 3 episodes of nonblack, nonbloody diarrhea per day for 5 days. No other symptoms at this time.      Complaining of left ear lesion and cyst.  States had an outbreak or so on lip about a year and half ago and was given cream and did not come back.  States it has been coming and ongong every couple of months.  States that it was like a little pimple and then it would pop and crust over.  Notes that when the pimple starts to form it is very tender.  No ringing of ears and hearing is fine.  Stated that it was previously around his upper ear.   States that he is in a relationship now but prior to that 2 years ago he states that he did have some unprotected sexual encounters.  Was told at [facility] it was a cyst and was scheduled at [place] to have it removed but then the pandemic came around and he pushed this appointment off.  Would like to be screened for STIs.     REVIEW OF SYSTEMS   Review of Systems   Constitutional: Negative for chills and fever.   HENT: Negative for congestion and postnasal drip.         Ear lesion   Eyes: Negative for photophobia and visual disturbance.   Respiratory: Negative for  "cough and shortness of breath.    Cardiovascular: Negative for chest pain and palpitations.   Gastrointestinal: Positive for diarrhea. Negative for nausea and vomiting.        Diarrhea now resolving   Endocrine: Negative for polyphagia and polyuria.   Genitourinary: Negative for difficulty urinating, dysuria, frequency and genital sores.   Musculoskeletal: Negative for arthralgias and back pain.   Neurological: Negative for dizziness, seizures, weakness and numbness.   Hematological: Negative for adenopathy. Does not bruise/bleed easily.   Psychiatric/Behavioral: Negative for agitation and behavioral problems.        MEDICATIONS      No current outpatient medications on file.    PHYSICAL EXAMINATION   Vital signs:   Visit Vitals  /70   Pulse 94   Temp 36.8 °C (98.2 °F) (Temporal)   Resp 18   Ht 1.727 m (5' 8\")   Wt 116 kg (256 lb)   SpO2 95%   BMI 38.92 kg/m²     Physical Exam  Constitutional:       General: He is not in acute distress.     Appearance: Normal appearance. He is not ill-appearing or toxic-appearing.   HENT:      Head: Normocephalic and atraumatic.      Ears:      Comments: Pt with erythematous area overlying tragus, but no real lesion or pustule appreciated.  nontender to palpation.  Non fluctuance or cystic lesion appreciated at this time     Nose: Nose normal.      Mouth/Throat:      Mouth: Mucous membranes are moist.      Pharynx: No oropharyngeal exudate.   Eyes:      General: No scleral icterus.     Extraocular Movements: Extraocular movements intact.      Pupils: Pupils are equal, round, and reactive to light.   Neck:      Musculoskeletal: Normal range of motion.   Cardiovascular:      Rate and Rhythm: Normal rate and regular rhythm.      Pulses: Normal pulses.      Heart sounds: No murmur.   Pulmonary:      Effort: Pulmonary effort is normal.      Breath sounds: No wheezing, rhonchi or rales.   Abdominal:      General: Abdomen is flat.      Tenderness: There is no abdominal tenderness. " There is no guarding or rebound.   Musculoskeletal: Normal range of motion.      Right lower leg: No edema.      Left lower leg: No edema.   Skin:     General: Skin is warm.      Capillary Refill: Capillary refill takes less than 2 seconds.      Findings: No bruising or erythema.   Neurological:      General: No focal deficit present.      Mental Status: He is alert and oriented to person, place, and time.      Motor: No weakness.   Psychiatric:         Mood and Affect: Mood normal.         Behavior: Behavior normal.          LABS / IMAGING/STUDIES      Labs Reviewed:   Last BMP:  Lab Results   Component Value Date     12/10/2020    K 3.7 12/10/2020     12/10/2020    CO2 24 12/10/2020    BUN 17 12/10/2020    CREATININE 1.2 12/10/2020       Last CBC:  Lab Results   Component Value Date    WBC 7.77 12/10/2020    HGB 15.5 12/10/2020    HCT 47.7 12/10/2020    MCV 94.5 12/10/2020     12/10/2020       Last Lipids:  Lab Results   Component Value Date    CHOL 197 08/26/2020    HDL 47 08/26/2020    LDLCALC 137 (H) 08/26/2020    TRIG 64 08/26/2020       Last three HgbA1c:  Lab Results   Component Value Date    HGBA1C 5.9 (H) 08/26/2020       Last Microalbumin:  No results found for: MICROALBUR    Last TSH:  No results found for: TSH    Last Vitamin D:  No results found for: [id number]      ASSESSMENT AND PLAN      Problem List Items Addressed This Visit        High    Mural thickening of colon - Primary     Seen on CT imaging during recent hospitalization; admits to associated abdominal pain and some blood in stools which apparently has been ongoing for over a year.  Possibly recent substance abuse could be contributing to findings given amphetamines seen in UDS.  Was provided with referral for colonoscopy with GI and Miralax to supplement bowl function as he had relayed some straining.  Had been having some mild blood on toilet paper and in stools; Hgb has been stable.     - Encouraged to follow up with GI  for colonoscopy; follow up scheduled for January  - Miralax as needed to supplement BMs         Relevant Orders    Amb[place] [facility]       Medium    Hypertension     BP in the office of 108/70; previously with SBP ranging from high 130s to 140s    - Can hold off starting medication at this time  - Consider ARB in the future given prediabetes            Low    Asthma     Chronic medical condition  Pt states stable; using albuterol inhaler    - Continue with current treatment with Albuterol inhaler            Unprioritized    Diarrhea     Presented to the emergency department on 12/10 for diarrhea  Blood work WNL discharged with concerns for possible viral gastroenteritis    - Monitor for further symptoms; but now resolved         Prediabetes     Hgb A1c of 5.9 most recently  The 10-year ASCVD risk score (Clemente CORBIN Jr., et al., 2013) is: 4.5%    Values used to calculate the score:      Age: 45 years      Sex: Male      Is Non- : Yes      Diabetic: No      Tobacco smoker: No      Systolic Blood Pressure: 137 mmHg      Is BP treated: No      HDL Cholesterol: 47 mg/dL      Total Cholesterol: 197 mg/dL    - At this time would hold off with treatment of diabetes and hold off statin  - Counseled on healthy diet and exercise modification  - Repeat Hgb A1c in 6 months to a year         Lesion of left ear     Complaining of left ear lesion and cyst.  States had an outbreak or so on lip about a year and half ago and was given cream and did not come back.  States it has been coming and ongong every couple of months.  States that it was like a little pimple and then it would pop and crust over.  Notes that when the pimple starts to form it is very tender.  No ringing of ears and hearing is fine.  Stated that it was previously around his upper ear.   States that he is in a relationship now but prior to that 2 years ago he states that he did have some unprotected sexual  encounters.  Was told at [facility] it was a cyst and was scheduled at [place] to have it removed but then the pandemic came around and he pushed this appointment off.  Would like to be screened for STIs.    - Pt instructed to return should this lesion present in its early form  - Holding off on treating for herpes as lesion is singular and would be uncommon on ear; patietn realying pus drainage; sounds more like pimple or cyst as described as solitary lesion         Possible exposure to STD     Pt concerned about possible STD exposure given promiscuous and non protected sexual encounters in the past    - Ordered HIV, Hep C, GC, Chlamydia, and RPR         Relevant Orders    HIV 1,2 AB P24 AG    Hepatitis C antibody    RPR    Chlamydia/GC RNA:ThinPrep,Urine,Swab           HEALTHCARE MAINTENANCE   Health Maintenance Due   Topic Date Due   • Varicella Vaccines (1 of 2 - 2-dose childhood series) 05/01/1977   • Pneumococcal (1 of 1 - PPSV23) 05/01/1982   • HIV Screening  05/01/1989   • Hepatitis C Screening  05/01/1994   • Influenza Vaccine (1) 08/01/2020          Sergei Tolentino DO  12/11/20  3:03 PM

## 2020-12-11 NOTE — PATIENT INSTRUCTIONS
Thank you for presenting to the [facility] clinic.    - Please return to the clinic should you experience this lesion coming back on your ear again in its early form.  This is less likely to be herpes based on your description of the lesion but definitive diagnosis could be made if we are able to see it and swab it in its early stages.  Likely it is a pimple or a recurring cyst.   - Please obtain the blood work ordered.  - Please follow up with GI and obtain your colonoscopy.  - Please follow up in 6 months for further evaluation.

## 2020-12-11 NOTE — ASSESSMENT & PLAN NOTE
Presented to the emergency department on 12/10 for diarrhea  Blood work WNL discharged with concerns for possible viral gastroenteritis    - Monitor for further symptoms; but now resolved

## 2020-12-11 NOTE — ASSESSMENT & PLAN NOTE
Chronic medical condition  Pt states stable; using albuterol inhaler    - Continue with current treatment with Albuterol inhaler

## 2020-12-11 NOTE — ASSESSMENT & PLAN NOTE
Hgb A1c of 5.9 most recently  The 10-year ASCVD risk score (Clemente CORBIN Jr., et al., 2013) is: 4.5%    Values used to calculate the score:      Age: 45 years      Sex: Male      Is Non- : Yes      Diabetic: No      Tobacco smoker: No      Systolic Blood Pressure: 137 mmHg      Is BP treated: No      HDL Cholesterol: 47 mg/dL      Total Cholesterol: 197 mg/dL    - At this time would hold off with treatment of diabetes and hold off statin  - Counseled on healthy diet and exercise modification  - Repeat Hgb A1c in 6 months to a year

## 2020-12-11 NOTE — ASSESSMENT & PLAN NOTE
BP in the office of 108/70; previously with SBP ranging from high 130s to 140s    - Can hold off starting medication at this time  - Consider ARB in the future given prediabetes

## 2020-12-13 LAB
HCV AB S/CO SERPL IA: <0.1 S/CO RATIO (ref 0–0.9)
HIV 1+2 AB+HIV1 P24 AG SERPL QL IA: NON REACTIVE
RPR SER QL: NON REACTIVE

## 2020-12-14 LAB
C TRACH RRNA SPEC QL NAA+PROBE: NEGATIVE
N GONORRHOEA RRNA SPEC QL NAA+PROBE: NEGATIVE

## 2020-12-15 ENCOUNTER — TELEPHONE (OUTPATIENT)
Dept: INTERNAL MEDICINE | Facility: HOSPITAL | Age: 45
End: 2020-12-15

## 2020-12-15 NOTE — TELEPHONE ENCOUNTER
Called and updated patient regarding negative HIV, hep C, and RPR results.  Still awaiting results from GC/Chlamydia urine screen.

## 2021-02-07 ENCOUNTER — HOSPITAL ENCOUNTER (EMERGENCY)
Facility: HOSPITAL | Age: 46
Discharge: HOME | End: 2021-02-07
Attending: EMERGENCY MEDICINE
Payer: COMMERCIAL

## 2021-02-07 VITALS
TEMPERATURE: 99.1 F | WEIGHT: 265 LBS | BODY MASS INDEX: 41.59 KG/M2 | HEART RATE: 69 BPM | RESPIRATION RATE: 16 BRPM | OXYGEN SATURATION: 99 % | DIASTOLIC BLOOD PRESSURE: 56 MMHG | HEIGHT: 67 IN | SYSTOLIC BLOOD PRESSURE: 119 MMHG

## 2021-02-07 DIAGNOSIS — U07.1 COVID-19: Primary | ICD-10-CM

## 2021-02-07 LAB
ALBUMIN SERPL-MCNC: 4.7 G/DL (ref 3.4–5)
ALP SERPL-CCNC: 58 IU/L (ref 35–126)
ALT SERPL-CCNC: 37 IU/L (ref 16–63)
ANION GAP SERPL CALC-SCNC: 10 MEQ/L (ref 3–15)
AST SERPL-CCNC: 35 IU/L (ref 15–41)
BASOPHILS # BLD: 0.02 K/UL (ref 0.01–0.1)
BASOPHILS NFR BLD: 0.5 %
BILIRUB SERPL-MCNC: 0.7 MG/DL (ref 0.3–1.2)
BUN SERPL-MCNC: 12 MG/DL (ref 8–20)
CALCIUM SERPL-MCNC: 8.8 MG/DL (ref 8.9–10.3)
CHLORIDE SERPL-SCNC: 101 MEQ/L (ref 98–109)
CK SERPL-CCNC: 267 U/L (ref 16–300)
CO2 SERPL-SCNC: 26 MEQ/L (ref 22–32)
CREAT SERPL-MCNC: 1.3 MG/DL (ref 0.8–1.3)
DIFFERENTIAL METHOD BLD: ABNORMAL
EOSINOPHIL # BLD: 0 K/UL (ref 0.04–0.54)
EOSINOPHIL NFR BLD: 0 %
ERYTHROCYTE [DISTWIDTH] IN BLOOD BY AUTOMATED COUNT: 12.2 % (ref 11.6–14.4)
FLUAV RNA SPEC QL NAA+PROBE: NEGATIVE
FLUBV RNA SPEC QL NAA+PROBE: NEGATIVE
GFR SERPL CREATININE-BSD FRML MDRD: >60 ML/MIN/1.73M*2
GLUCOSE SERPL-MCNC: 98 MG/DL (ref 70–99)
HCT VFR BLDCO AUTO: 51.2 % (ref 40.1–51)
HGB BLD-MCNC: 16.9 G/DL (ref 13.7–17.5)
IMM GRANULOCYTES # BLD AUTO: 0.01 K/UL (ref 0–0.08)
IMM GRANULOCYTES NFR BLD AUTO: 0.3 %
LYMPHOCYTES # BLD: 1.36 K/UL (ref 1.2–3.5)
LYMPHOCYTES NFR BLD: 37.3 %
MCH RBC QN AUTO: 30.4 PG (ref 28–33.2)
MCHC RBC AUTO-ENTMCNC: 33 G/DL (ref 32.2–36.5)
MCV RBC AUTO: 92.1 FL (ref 83–98)
MONOCYTES # BLD: 0.46 K/UL (ref 0.3–1)
MONOCYTES NFR BLD: 12.6 %
NEUTROPHILS # BLD: 1.8 K/UL (ref 1.7–7)
NEUTS SEG NFR BLD: 49.3 %
NRBC BLD-RTO: 0 %
OVALOCYTES BLD QL SMEAR: ABNORMAL
PDW BLD AUTO: 9.2 FL (ref 9.4–12.4)
PLATELET # BLD AUTO: 212 K/UL (ref 150–350)
PLATELET # BLD EST: ABNORMAL 10*3/UL
PLATELET CLUMP BLD QL SMEAR: PRESENT
POTASSIUM SERPL-SCNC: 3.5 MEQ/L (ref 3.6–5.1)
PROT SERPL-MCNC: 8.3 G/DL (ref 6–8.2)
RBC # BLD AUTO: 5.56 M/UL (ref 4.5–5.8)
RSV RNA SPEC QL NAA+PROBE: NEGATIVE
SARS-COV-2 RNA RESP QL NAA+PROBE: POSITIVE
SODIUM SERPL-SCNC: 137 MEQ/L (ref 136–144)
WBC # BLD AUTO: 3.65 K/UL (ref 3.8–10.5)

## 2021-02-07 PROCEDURE — 3E0333Z INTRODUCTION OF ANTI-INFLAMMATORY INTO PERIPHERAL VEIN, PERCUTANEOUS APPROACH: ICD-10-PCS | Performed by: EMERGENCY MEDICINE

## 2021-02-07 PROCEDURE — 63700000 HC SELF-ADMINISTRABLE DRUG: Performed by: PHYSICIAN ASSISTANT

## 2021-02-07 PROCEDURE — 63600000 HC DRUGS/DETAIL CODE: Performed by: EMERGENCY MEDICINE

## 2021-02-07 PROCEDURE — 3E0337Z INTRODUCTION OF ELECTROLYTIC AND WATER BALANCE SUBSTANCE INTO PERIPHERAL VEIN, PERCUTANEOUS APPROACH: ICD-10-PCS | Performed by: EMERGENCY MEDICINE

## 2021-02-07 PROCEDURE — 25800000 HC PHARMACY IV SOLUTIONS: Performed by: EMERGENCY MEDICINE

## 2021-02-07 PROCEDURE — 82550 ASSAY OF CK (CPK): CPT | Performed by: EMERGENCY MEDICINE

## 2021-02-07 PROCEDURE — 85025 COMPLETE CBC W/AUTO DIFF WBC: CPT | Performed by: PHYSICIAN ASSISTANT

## 2021-02-07 PROCEDURE — 87637 SARSCOV2&INF A&B&RSV AMP PRB: CPT | Performed by: PHYSICIAN ASSISTANT

## 2021-02-07 PROCEDURE — 96374 THER/PROPH/DIAG INJ IV PUSH: CPT

## 2021-02-07 PROCEDURE — 99284 EMERGENCY DEPT VISIT MOD MDM: CPT | Mod: 25

## 2021-02-07 PROCEDURE — 36415 COLL VENOUS BLD VENIPUNCTURE: CPT | Performed by: PHYSICIAN ASSISTANT

## 2021-02-07 PROCEDURE — 80053 COMPREHEN METABOLIC PANEL: CPT | Performed by: PHYSICIAN ASSISTANT

## 2021-02-07 PROCEDURE — 96361 HYDRATE IV INFUSION ADD-ON: CPT

## 2021-02-07 RX ORDER — KETOROLAC TROMETHAMINE 15 MG/ML
15 INJECTION, SOLUTION INTRAMUSCULAR; INTRAVENOUS ONCE
Status: COMPLETED | OUTPATIENT
Start: 2021-02-07 | End: 2021-02-07

## 2021-02-07 RX ORDER — POTASSIUM CHLORIDE 750 MG/1
40 TABLET, FILM COATED, EXTENDED RELEASE ORAL ONCE
Status: COMPLETED | OUTPATIENT
Start: 2021-02-07 | End: 2021-02-07

## 2021-02-07 RX ADMIN — POTASSIUM CHLORIDE 40 MEQ: 750 TABLET, EXTENDED RELEASE ORAL at 14:54

## 2021-02-07 RX ADMIN — KETOROLAC TROMETHAMINE 15 MG: 15 INJECTION, SOLUTION INTRAMUSCULAR; INTRAVENOUS at 13:54

## 2021-02-07 RX ADMIN — SODIUM CHLORIDE 1000 ML: 9 INJECTION, SOLUTION INTRAVENOUS at 13:54

## 2021-02-07 ASSESSMENT — ENCOUNTER SYMPTOMS
WEAKNESS: 0
ABDOMINAL PAIN: 0
SHORTNESS OF BREATH: 0
DIARRHEA: 0
MYALGIAS: 1
CHILLS: 1
DYSURIA: 0
FATIGUE: 1
FEVER: 0
VOMITING: 0
APPETITE CHANGE: 1
CONFUSION: 0

## 2021-02-07 NOTE — DISCHARGE INSTRUCTIONS
Please self quarantine at home for the next 10 days and limit your contact with others.  Be sure to use proper universal precautions such as washing your hands and covering your sneezes/coughs with your elbow.  Please return immediately for any new, worsening or concerning symptoms such as worsening pain, shortness of breath, confusion, lethargy, or oxygen level persistently less than 92%.

## 2021-02-07 NOTE — ED ATTESTATION NOTE
"Procedures  Physical Exam  Review of Systems    2/7/20211:20 PM  I have personally seen and examined the patient.  I reviewed and agree with the PA/NP/Resident's assessment and plan of care.    My examination, assessment, and plan of care of Fernando Lyle is as follows:  The patient presents with decreased appetite, chills and body aches for the past 5 days.  The patient states the symptoms are reminiscent of \"sepsis\" that the patient suffered in 2016.  He was treated at [facility].  He states they never gave him a source of the infection.  He was admitted and treated with IV antibiotics.  The patient has no other significant past medical history and does not take any medications on a daily basis.  Exam: The patient is alert in no acute distress.  He is well-built, well-nourished.  There is no evidence of dehydration.  The patient's heart is regular without murmur or ectopy.  His lungs are clear bilaterally.  He has increased bowel sounds and his abdomen is soft and nontender.  He has no peripheral edema and he is neurovascularly intact.  Impression/Plan: The patient is being evaluated with lab work and we will swab him for the flu and Covid.  He is being treated with IV fluids and Toradol.    3:45 PM  The patient tested positive for COVID-19.  He is stable for discharge home and will quarantine.  He will follow-up with his family doctor.    Vital Sign Review: Vital signs have been ordered and reviewed. The oxygen saturation is 99% on room air, normal.     I was physically present for the key/critical portions of the following procedures: None    This document was created using dragon dictation software.  There might be some typographical errors due to this technology.     Zeyad Dorsey,   02/07/21 1545    "
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
## 2021-02-10 ENCOUNTER — TELEPHONE (OUTPATIENT)
Dept: INTERNAL MEDICINE | Facility: HOSPITAL | Age: 46
End: 2021-02-10

## 2021-02-11 ENCOUNTER — TELEPHONE (OUTPATIENT)
Dept: INTERNAL MEDICINE | Facility: HOSPITAL | Age: 46
End: 2021-02-11

## 2021-02-11 NOTE — TELEPHONE ENCOUNTER
Vanessa first called in regards to pt and will send over a script for a covid care kit for pt. On [facility] webpage  Let us know form.  [phone] has to call   [phone]

## 2021-02-12 ENCOUNTER — HOSPITAL ENCOUNTER (OUTPATIENT)
Facility: HOSPITAL | Age: 46
End: 2021-02-12
Attending: INTERNAL MEDICINE | Admitting: INTERNAL MEDICINE
Payer: COMMERCIAL

## 2021-02-13 DIAGNOSIS — J45.909 ASTHMA, UNSPECIFIED ASTHMA SEVERITY, UNSPECIFIED WHETHER COMPLICATED, UNSPECIFIED WHETHER PERSISTENT: Primary | ICD-10-CM

## 2021-02-13 RX ORDER — ALBUTEROL SULFATE 90 UG/1
2 INHALANT RESPIRATORY (INHALATION) EVERY 4 HOURS PRN
Qty: 8 G | Refills: 5 | Status: SHIPPED | OUTPATIENT
Start: 2021-02-13 | End: 2022-03-03 | Stop reason: SDUPTHER

## 2021-02-13 NOTE — PROGRESS NOTES
Called and spoke with patient regarding recent COVID infection.  Will order and refill Albuterol inhaler script.

## 2021-04-27 ENCOUNTER — TELEPHONE (OUTPATIENT)
Dept: INTERNAL MEDICINE | Facility: HOSPITAL | Age: 46
End: 2021-04-27

## 2021-04-27 NOTE — TELEPHONE ENCOUNTER
Patient called and left a vm asking to schedule an appointment with his provider, I called patient back, he was not available so I left him a vm to call the office to schedule.

## 2021-06-17 ENCOUNTER — TELEPHONE (OUTPATIENT)
Dept: INTERNAL MEDICINE | Facility: HOSPITAL | Age: 46
End: 2021-06-17

## 2021-06-17 NOTE — TELEPHONE ENCOUNTER
Pt called in stating that he get skin lesions behind his ear. Pt stated that he was told that he needs to come in when the skin lesions are present. Pt says that every time that he has them the schedule be to far out and they go away. I suggested that pt sign up for my chart, and take a photo and send for you to see.

## 2021-06-21 NOTE — TELEPHONE ENCOUNTER
Spoke with patient and he states that he went to an Urgent Care and they were able to swab the lesion and he is waiting for results.  He was able to take a picture of the lesion and is scheduled to follow up with Dr. Villela in the office tomorrow which is perfect.

## 2021-06-22 ENCOUNTER — OFFICE VISIT (OUTPATIENT)
Dept: INTERNAL MEDICINE | Facility: HOSPITAL | Age: 46
End: 2021-06-22
Attending: HOSPITALIST
Payer: COMMERCIAL

## 2021-06-22 VITALS
DIASTOLIC BLOOD PRESSURE: 72 MMHG | HEIGHT: 67 IN | BODY MASS INDEX: 43 KG/M2 | SYSTOLIC BLOOD PRESSURE: 126 MMHG | OXYGEN SATURATION: 98 % | TEMPERATURE: 97.2 F | HEART RATE: 64 BPM | WEIGHT: 274 LBS

## 2021-06-22 DIAGNOSIS — J45.909 ASTHMA, UNSPECIFIED ASTHMA SEVERITY, UNSPECIFIED WHETHER COMPLICATED, UNSPECIFIED WHETHER PERSISTENT: ICD-10-CM

## 2021-06-22 DIAGNOSIS — H93.92 LESION OF LEFT EAR: Primary | ICD-10-CM

## 2021-06-22 PROCEDURE — 3008F BODY MASS INDEX DOCD: CPT | Performed by: HOSPITALIST

## 2021-06-22 PROCEDURE — 99213 OFFICE O/P EST LOW 20 MIN: CPT | Performed by: HOSPITALIST

## 2021-06-22 RX ORDER — CLOTRIMAZOLE 1 %
CREAM (GRAM) TOPICAL 2 TIMES DAILY
Qty: 45 G | Refills: 1 | Status: SHIPPED | OUTPATIENT
Start: 2021-06-22 | End: 2021-06-22 | Stop reason: SDUPTHER

## 2021-06-22 RX ORDER — CLOTRIMAZOLE 1 %
CREAM (GRAM) TOPICAL 2 TIMES DAILY
Qty: 45 G | Refills: 1 | Status: SHIPPED | OUTPATIENT
Start: 2021-06-22 | End: 2021-07-22

## 2021-06-22 RX ORDER — MUPIROCIN 20 MG/G
OINTMENT TOPICAL
COMMUNITY
Start: 2021-06-20

## 2021-06-22 ASSESSMENT — PATIENT HEALTH QUESTIONNAIRE - PHQ9: SUM OF ALL RESPONSES TO PHQ9 QUESTIONS 1 & 2: 0

## 2021-06-22 ASSESSMENT — PAIN SCALES - GENERAL: PAINLEVEL: 0-NO PAIN

## 2021-06-22 NOTE — PROGRESS NOTES
"     [facility]  Internal Medicine Progress Note       SUBJECTIVE   Fernando Lyle is a 46 y.o. year-old male with a past medical history of asthma  who presents for ear lesion.    Interval History: History of recurrent lesion on left ear. He notes this is the 3rd time in the last 2 years where he has had a similar lesion. He notes that the lesion starts with some dry itchy skin, sometimes raised papules, it is never painful, swollen or red, he admits to some clear drainage at times by denies purulence. Denies fevers and chills. No systemic symptoms. No adenopathy, ear pain, trouble hearing.   He was seen at  in [place] (no records for review) and given keflex for 10 days, mupirocin and Lotrisone. He notes improvement in the lesion.       REVIEW OF SYSTEMS   Review of Systems as above     MEDICATIONS        Current Outpatient Medications:   •  albuterol HFA (VENTOLIN HFA) 90 mcg/actuation inhaler, Inhale 2 puffs every 4 (four) hours as needed for wheezing or shortness of breath., Disp: 8 g, Rfl: 5  •  clotrimazole (LOTRIMIN) 1 % topical cream, Apply topically 2 (two) times a day., Disp: 45 g, Rfl: 1  •  mupirocin (BACTROBAN) 2 % ointment, , Disp: , Rfl:     PHYSICAL EXAMINATION   Vital signs:   Visit Vitals  /72 (BP Location: Left upper arm, Patient Position: Sitting)   Pulse 64   Temp 36.2 °C (97.2 °F)   Ht 1.702 m (5' 7\")   Wt 124 kg (274 lb)   SpO2 98%   BMI 42.91 kg/m²     Physical Exam  Constitutional:       Appearance: He is well-developed. He is obese.   HENT:      Head: Normocephalic and atraumatic.      Right Ear: Tympanic membrane, ear canal and external ear normal.      Left Ear: Tympanic membrane and ear canal normal.      Ears:      Comments: See picture  Cardiovascular:      Rate and Rhythm: Normal rate and regular rhythm.      Heart sounds: Normal heart sounds. No murmur heard.     Pulmonary:      Effort: Pulmonary effort is normal. No respiratory distress.      Breath sounds: " Normal breath sounds. No wheezing or rales.   Skin:     Findings: Rash present.   Neurological:      Mental Status: He is alert and oriented to person, place, and time. Mental status is at baseline.   Psychiatric:         Mood and Affect: Mood normal.         Behavior: Behavior normal.              LABS / IMAGING/STUDIES      Labs Reviewed: No new labs. Labs from 2/7 reviewed and without clinical concern.    Studies/Imaging Reviewed: No new imaging.         ASSESSMENT AND PLAN      Problem List Items Addressed This Visit        Nervous    Lesion of left ear - Primary    Current Assessment & Plan     Given that there is little to no prodrome, swelling, redness or pain, this is unlikely to be a viral outbreak. Given the appearance of the lesion, with clear exudate and without purrulence this is unlikely to be a primary bacterial process. It is likely fungal in nature given it is itchy and flaking, though it could be eczematous. I have advised a shortened course of antibiotics of 5 days to reduce exposure. I have advised continuing clotrimazole monotherapy BID for 1 week with occasional mupirocin as needed.   Given its recurrent nature and his concern, I have given him a referral to dermatology for further evaluation.          Relevant Medications    clotrimazole (LOTRIMIN) 1 % topical cream    Other Relevant Orders    Ambulatory referral to Dermatology       Respiratory    Asthma    Current Assessment & Plan     Stable on prn albuterol.                 HEALTHCARE MAINTENANCE   Health Maintenance Due   Topic Date Due   • Varicella Vaccines (1 of 2 - 2-dose childhood series) Never done   • Pneumococcal (1 of 2 - PPSV23) Never done   • COVID-19 Vaccine (1) Never done          Sergei Villela DO  06/22/21  3:34 PM

## 2021-06-22 NOTE — PATIENT INSTRUCTIONS
It was great visiting with you today!    Today at our visit we discussed:    -Please use Clotrimazole cream on your ear twice a day for 1 week or until lesion is gone. Please use mupiricin ointment once daily as well.     -Please call for an appointment [facility] office in [place] [phone].           Please call our office ([phone]) with any questions or concerns    Given the current COVID pandemic please continue to practice social distancing avoiding interactions with as many people as possible, do not let people into your home, and avoid public places. If you must leave your home please wear a face covering, stay 6 ft apart, do not touch your face and practice exceptional hand hygiene with soap and water (hand  is also good).  IF you do develop cough, fever, muscle aches, persistent headache, or loss of smell please call our office and we will further advise you regarding testing.     Dr. Villela

## 2021-06-22 NOTE — ASSESSMENT & PLAN NOTE
Given that there is little to no prodrome, swelling, redness or pain, this is unlikely to be a viral outbreak. Given the appearance of the lesion, with clear exudate and without purrulence this is unlikely to be a primary bacterial process. It is likely fungal in nature given it is itchy and flaking, though it could be eczematous. I have advised a shortened course of antibiotics of 5 days to reduce exposure. I have advised continuing clotrimazole monotherapy BID for 1 week with occasional mupirocin as needed.   Given its recurrent nature and his concern, I have given him a referral to dermatology for further evaluation.

## 2021-09-01 ENCOUNTER — TELEPHONE (OUTPATIENT)
Dept: INTERNAL MEDICINE | Facility: HOSPITAL | Age: 46
End: 2021-09-01

## 2021-09-05 DIAGNOSIS — R19.7 DIARRHEA, UNSPECIFIED TYPE: Primary | ICD-10-CM

## 2021-11-01 ENCOUNTER — HOSPITAL ENCOUNTER (EMERGENCY)
Facility: HOSPITAL | Age: 46
Discharge: HOME | End: 2021-11-02
Attending: EMERGENCY MEDICINE
Payer: COMMERCIAL

## 2021-11-01 DIAGNOSIS — S33.5XXA LUMBAR SPRAIN, INITIAL ENCOUNTER: Primary | ICD-10-CM

## 2021-11-01 PROCEDURE — 99282 EMERGENCY DEPT VISIT SF MDM: CPT

## 2021-11-02 VITALS
DIASTOLIC BLOOD PRESSURE: 83 MMHG | HEART RATE: 98 BPM | OXYGEN SATURATION: 98 % | WEIGHT: 255 LBS | HEIGHT: 68 IN | BODY MASS INDEX: 38.65 KG/M2 | RESPIRATION RATE: 18 BRPM | SYSTOLIC BLOOD PRESSURE: 135 MMHG | TEMPERATURE: 97.9 F

## 2021-11-02 RX ORDER — METAXALONE 800 MG/1
800 TABLET ORAL 3 TIMES DAILY
Qty: 21 TABLET | Refills: 0 | Status: SHIPPED | OUTPATIENT
Start: 2021-11-02 | End: 2021-11-16

## 2021-11-02 RX ORDER — NAPROXEN 500 MG/1
500 TABLET ORAL 2 TIMES DAILY WITH MEALS
Qty: 30 TABLET | Refills: 0 | Status: SHIPPED | OUTPATIENT
Start: 2021-11-02 | End: 2022-05-01

## 2021-11-02 ASSESSMENT — ENCOUNTER SYMPTOMS
NECK PAIN: 0
DIARRHEA: 0
NAUSEA: 0
SEIZURES: 0
SPEECH DIFFICULTY: 0
BACK PAIN: 0
ACTIVITY CHANGE: 0
FACIAL SWELLING: 0
HEADACHES: 0
SORE THROAT: 0
COUGH: 0
HEMATURIA: 0
DIAPHORESIS: 0
VOMITING: 0
WHEEZING: 0
FEVER: 0
COLOR CHANGE: 0
ABDOMINAL PAIN: 0
DIFFICULTY URINATING: 0
SHORTNESS OF BREATH: 0
WEAKNESS: 0
AGITATION: 0
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
## 2021-11-04 PROBLEM — Z09 HOSPITAL DISCHARGE FOLLOW-UP: Status: ACTIVE | Noted: 2021-11-04

## 2022-03-02 ENCOUNTER — TELEPHONE (OUTPATIENT)
Dept: INTERNAL MEDICINE | Facility: HOSPITAL | Age: 47
End: 2022-03-02
Payer: COMMERCIAL

## 2022-03-02 NOTE — TELEPHONE ENCOUNTER
Patient sent an electronic appointment request to the a visit for coughing causing him to pass out. Patient was unaware that he had a syncopal episode as his girlfriend asked what happened to his chin. The patient fell after coughing and blowing his nose. Patient denies as dizziness and is scheduled for tomorrow.

## 2022-03-03 ENCOUNTER — OFFICE VISIT (OUTPATIENT)
Dept: INTERNAL MEDICINE | Facility: HOSPITAL | Age: 47
End: 2022-03-03
Attending: STUDENT IN AN ORGANIZED HEALTH CARE EDUCATION/TRAINING PROGRAM
Payer: COMMERCIAL

## 2022-03-03 ENCOUNTER — APPOINTMENT (OUTPATIENT)
Dept: LAB | Facility: HOSPITAL | Age: 47
End: 2022-03-03
Attending: STUDENT IN AN ORGANIZED HEALTH CARE EDUCATION/TRAINING PROGRAM
Payer: COMMERCIAL

## 2022-03-03 VITALS
TEMPERATURE: 96.8 F | WEIGHT: 265.1 LBS | DIASTOLIC BLOOD PRESSURE: 77 MMHG | HEART RATE: 84 BPM | BODY MASS INDEX: 41.61 KG/M2 | RESPIRATION RATE: 19 BRPM | SYSTOLIC BLOOD PRESSURE: 130 MMHG | OXYGEN SATURATION: 96 % | HEIGHT: 67 IN

## 2022-03-03 DIAGNOSIS — R73.03 PREDIABETES: ICD-10-CM

## 2022-03-03 DIAGNOSIS — J06.9 VIRAL UPPER RESPIRATORY TRACT INFECTION: ICD-10-CM

## 2022-03-03 DIAGNOSIS — R19.7 DIARRHEA, UNSPECIFIED: ICD-10-CM

## 2022-03-03 DIAGNOSIS — J45.909 ASTHMA, UNSPECIFIED ASTHMA SEVERITY, UNSPECIFIED WHETHER COMPLICATED, UNSPECIFIED WHETHER PERSISTENT: ICD-10-CM

## 2022-03-03 DIAGNOSIS — E66.01 MORBID OBESITY (CMS/HCC): ICD-10-CM

## 2022-03-03 DIAGNOSIS — Z12.11 SCREENING FOR COLON CANCER: Primary | ICD-10-CM

## 2022-03-03 PROCEDURE — 3008F BODY MASS INDEX DOCD: CPT | Performed by: STUDENT IN AN ORGANIZED HEALTH CARE EDUCATION/TRAINING PROGRAM

## 2022-03-03 PROCEDURE — 99213 OFFICE O/P EST LOW 20 MIN: CPT | Mod: GE,GC | Performed by: STUDENT IN AN ORGANIZED HEALTH CARE EDUCATION/TRAINING PROGRAM

## 2022-03-03 RX ORDER — ALBUTEROL SULFATE 90 UG/1
2 INHALANT RESPIRATORY (INHALATION) EVERY 4 HOURS PRN
Qty: 8 G | Refills: 5 | Status: SHIPPED | OUTPATIENT
Start: 2022-03-03 | End: 2023-03-03

## 2022-03-03 ASSESSMENT — PATIENT HEALTH QUESTIONNAIRE - PHQ9: SUM OF ALL RESPONSES TO PHQ9 QUESTIONS 1 & 2: 0

## 2022-03-03 ASSESSMENT — PAIN SCALES - GENERAL: PAINLEVEL: 0-NO PAIN

## 2022-03-03 NOTE — PROGRESS NOTES
[facility]  Internal Medicine Progress Note       ASSESSMENT AND PLAN     Viral upper respiratory tract infection  3 days history of cough intermittently productive of greenish sputum. No reported dyspnea or fevers. Had a syncopal episode after a cough spell.     Symptoms has since been improving.     -Likely upper respiratory viral infection given improvement in symptoms and near resolution in cough  -will hold off antibiotic coverage or further testing  -no evidence of asthma exacerbation     Morbid obesity (CMS/[facility])  Extensively counseled.    -Continue lifestyle modifications     Asthma  Controlled. Continue as needed albuterol. Has not needed in months.     Prediabetes  Check A1c. Continue lifestyle modifications     Loss of consciousness (CMS/[facility])  Syncopal episode following a cough spell lasting for seconds. No prodromal signs or symptoms, no seizure like activity. Episode occurred when standing up from bed.    Likely post cough related or vasovagal though no prodromes.   -counseled   -continue to monitor       Health care maintenance:   Colonoscopy: Due referred to GI  COVID vaccine: J&J on 12/15/2021, encouraged to get COVID-19 mRNA vaccine booster   Tdap up-to-date  HIV and hepatitis C screening negative in 2020  a1c 5.9 in 2020  Will recheck a1c, tsh and lipid panel     No follow-ups on file.  At next visit:   Continue HCM  Encourage weight loss     Jose Castro MD     SUBJECTIVE     Fernando Lyle is a 46 y.o. year-old male, primary patient of Dr. Tolentino with a past medical history of asthma CKD? (1.3 but corresponds to egfr>60), morbid obesity, who presents for cough.    He was last seen by our office on 6/22/2021 with complaints of recurrent left ear lesion that thought to be fungal related.  He was started on clotrimazole topical cream and was referred to dermatology.    Interval History: He is presenting today with 3 days history of cough. He woke up coughing on Tuesday and  "had a possible syncopal episodes when he fell next to the toilet dresser, lasting for a few seconds, his wife told him he fell and his chin was hurting, no prior CP or palpitations. Episodes happened right after he stood up from bed. No postictal state, no seizure like activity, bowel or urinary continence.     He describes a dry cough initially that progressed to be productive of a greenish sputum and has now almost resolved. No dyspnea, no fevers, chills, no night sweats, no weight loss (had some weight gain). He describes a little rhinorrhea, no sore throat, but does endorses sinus congestion. He has a history of childhood asthma that is well controlled and has not used his albuterol in months.     To note, he tested positive for COVID-19 1 month ago when he had generalized malaise.     Works at a day care. Denies any known sick contacts or exposure to COVID-19.     He is currently feeling better. Last time he coughed was yesterday, ongoing sinus congestion.     COVID-19 vaccine: J&J in December 2021 with no booster.     Has a history of multiple allergies to dust and pollutants. He snores at night.     Smokes marijuana daily, no tobacco or other drug use. Occasional alcohol.     PHYSICAL EXAMINATION     Visit Vitals  /77 (BP Location: Left upper arm, Patient Position: Sitting)   Pulse 84   Temp (!) 36 °C (96.8 °F) (Temporal)   Resp 19   Ht 1.702 m (5' 7\")   Wt 120 kg (265 lb 1.6 oz)   SpO2 96%   BMI 41.52 kg/m²       Physical Exam  Vitals reviewed.   Constitutional:       Appearance: Normal appearance. He is obese.   Cardiovascular:      Rate and Rhythm: Normal rate and regular rhythm.      Pulses: Normal pulses.      Heart sounds: Normal heart sounds.   Pulmonary:      Effort: Pulmonary effort is normal.      Breath sounds: Normal breath sounds.   Abdominal:      General: Abdomen is flat. Bowel sounds are normal. There is no distension.      Palpations: Abdomen is soft.      Tenderness: There is no " abdominal tenderness. There is no guarding or rebound.   Musculoskeletal:      Cervical back: Normal range of motion and neck supple.      Right lower leg: No edema.      Left lower leg: No edema.   Skin:     General: Skin is warm.   Neurological:      General: No focal deficit present.      Mental Status: He is alert and oriented to person, place, and time. Mental status is at baseline.      Cranial Nerves: No cranial nerve deficit.      Sensory: No sensory deficit.   Psychiatric:         Mood and Affect: Mood normal.         Behavior: Behavior normal.         Thought Content: Thought content normal.         Judgment: Judgment normal.             LABS / IMAGING / STUDIES        Labs Reviewed: Pertinent radiology and labs reviewed    Studies/Imaging Reviewed: Pertinent radiology and labs reviewed    MEDICATIONS      Current Outpatient Medications   Medication Instructions   • albuterol HFA (VENTOLIN HFA) 90 mcg/actuation inhaler 2 puffs, inhalation, Every 4 hours PRN   • mupirocin (BACTROBAN) 2 % ointment No dose, route, or frequency recorded.   • naproxen (NAPROSYN) 500 mg, oral, 2 times daily with meals   • valACYclovir (VALTREX) 1 g, oral, 4 times daily, Take 2 tablets in the morning and 2 tablets in the evening for 1 day at the first sign of a flare

## 2022-03-03 NOTE — PATIENT INSTRUCTIONS
Mr Lyle,    You came to the office today complaining of cough and a syncopal episode. This is likely related to a viral infection. We expect your symptoms to improve within the next few days. Please stay well hydrated in the meantime.     If your symptoms worsens or fail to improve, please give our office a call and we will order a chest xray and possibly some antibiotics.     We will order some blood work to screen for diabetes and lipid disorders.    We strongly encourage you to lose weight and try to exercise. We have some resources available if you need help.    We referred you to GI to get your colonoscopy for screening.     Please get a Moderna or Pfizer COVID-19 vaccine. Please refer to [email or web address] to schedule an appointment.

## 2022-03-03 NOTE — PROGRESS NOTES
I discussed the case with the resident and agree with the findings and plan as documented in the note except for my comments below or within the additional notes today.    47 yo M wtih pmhx  asthma, CKD stage 1, obesity p/w cough.  Last seen June 2021, recurrent fungal related skin changes, improved on clotrimazole. 3 days cough, productive sputum, rhinitis, no sore throat.  No fevers/chills/night sweats.  No weight loss.   Fell, possible LOC, coughing fit related. Noted chin pain.  No CP/palpitations.  No dizziness. No incontinence.  Not postictal state or seizure activity.  Works in .     1. Viral URI likely with Cough - , could have risk RSV or viral illness. Improving already.  No fevers noted.  Mild sputum, if not improving consider treatment.  COVID vaccinated J+J (Dec 15, 2022), no booster.  If worsening, check CXR, CBC.    2. syncope - Likely cough related. EKG NSR, no prolonged QTC/delta waves from June.     3. Asthma - PRN albuterol, has not used in months.   no wheezing.    4. obesity - BMI 41.  Check FLP.     STI offered, FLP, A1c normal 2020, due with obesity.   Refer for colonoscopy.      Frida Ashlieto DO

## 2022-03-04 PROBLEM — Z12.11 SCREENING FOR COLON CANCER: Status: ACTIVE | Noted: 2022-03-04

## 2022-03-04 PROBLEM — Z09 HOSPITAL DISCHARGE FOLLOW-UP: Status: RESOLVED | Noted: 2021-11-04 | Resolved: 2022-03-04

## 2022-03-04 PROBLEM — K63.9 MURAL THICKENING OF COLON: Status: RESOLVED | Noted: 2020-08-26 | Resolved: 2022-03-04

## 2022-03-04 PROBLEM — J06.9 VIRAL UPPER RESPIRATORY TRACT INFECTION: Status: ACTIVE | Noted: 2022-03-04

## 2022-03-04 PROBLEM — R19.7 DIARRHEA: Status: RESOLVED | Noted: 2020-12-11 | Resolved: 2022-03-04

## 2022-03-04 PROBLEM — E66.01 MORBID OBESITY (CMS/HCC): Status: ACTIVE | Noted: 2022-03-04

## 2022-03-04 PROBLEM — R55 SYNCOPE: Status: ACTIVE | Noted: 2020-06-30

## 2022-03-04 PROBLEM — Z20.2 POSSIBLE EXPOSURE TO STD: Status: RESOLVED | Noted: 2020-12-11 | Resolved: 2022-03-04

## 2022-03-04 PROBLEM — H93.92 LESION OF LEFT EAR: Status: RESOLVED | Noted: 2020-12-11 | Resolved: 2022-03-04

## 2022-03-04 LAB
CHOLEST SERPL-MCNC: 150 MG/DL (ref 100–199)
HBA1C MFR BLD: 5.9 % (ref 4.8–5.6)
HDLC SERPL-MCNC: 35 MG/DL
LDLC SERPL CALC-MCNC: 99 MG/DL (ref 0–99)
T4 FREE SERPL-MCNC: 1.24 NG/DL (ref 0.82–1.77)
TRIGL SERPL-MCNC: 83 MG/DL (ref 0–149)
TSH SERPL DL<=0.005 MIU/L-ACNC: 1.32 UIU/ML (ref 0.45–4.5)
VLDLC SERPL CALC-MCNC: 16 MG/DL (ref 5–40)

## 2022-03-04 NOTE — ASSESSMENT & PLAN NOTE
3 days history of cough intermittently productive of greenish sputum. No reported dyspnea or fevers. Had a syncopal episode after a cough spell.     Symptoms has since been improving.     -Likely upper respiratory viral infection given improvement in symptoms and near resolution in cough  -will hold off antibiotic coverage or further testing  -no evidence of asthma exacerbation

## 2022-03-04 NOTE — ASSESSMENT & PLAN NOTE
Syncopal episode following a cough spell lasting for seconds. No prodromal signs or symptoms, no seizure like activity. Episode occurred when standing up from bed.    Likely post cough related or vasovagal though no prodromes.   -counseled   -continue to monitor

## 2022-05-12 ENCOUNTER — OFFICE VISIT (OUTPATIENT)
Dept: GASTROENTEROLOGY | Facility: HOSPITAL | Age: 47
End: 2022-05-12
Attending: STUDENT IN AN ORGANIZED HEALTH CARE EDUCATION/TRAINING PROGRAM
Payer: COMMERCIAL

## 2022-05-12 ENCOUNTER — OFFICE VISIT (OUTPATIENT)
Dept: INTERNAL MEDICINE | Facility: HOSPITAL | Age: 47
End: 2022-05-12
Attending: STUDENT IN AN ORGANIZED HEALTH CARE EDUCATION/TRAINING PROGRAM
Payer: COMMERCIAL

## 2022-05-12 ENCOUNTER — APPOINTMENT (OUTPATIENT)
Dept: LAB | Facility: HOSPITAL | Age: 47
End: 2022-05-12
Attending: STUDENT IN AN ORGANIZED HEALTH CARE EDUCATION/TRAINING PROGRAM
Payer: COMMERCIAL

## 2022-05-12 VITALS
HEART RATE: 111 BPM | SYSTOLIC BLOOD PRESSURE: 131 MMHG | TEMPERATURE: 97.9 F | BODY MASS INDEX: 40.18 KG/M2 | HEIGHT: 67 IN | OXYGEN SATURATION: 96 % | RESPIRATION RATE: 18 BRPM | DIASTOLIC BLOOD PRESSURE: 77 MMHG | WEIGHT: 256 LBS

## 2022-05-12 VITALS
WEIGHT: 256.8 LBS | OXYGEN SATURATION: 98 % | BODY MASS INDEX: 40.3 KG/M2 | HEIGHT: 67 IN | DIASTOLIC BLOOD PRESSURE: 75 MMHG | TEMPERATURE: 97.2 F | RESPIRATION RATE: 18 BRPM | SYSTOLIC BLOOD PRESSURE: 132 MMHG | HEART RATE: 102 BPM

## 2022-05-12 DIAGNOSIS — K63.9 COLON WALL THICKENING: ICD-10-CM

## 2022-05-12 DIAGNOSIS — R10.84 GENERALIZED ABDOMINAL PAIN: ICD-10-CM

## 2022-05-12 DIAGNOSIS — I10 HYPERTENSION, UNSPECIFIED TYPE: Primary | ICD-10-CM

## 2022-05-12 DIAGNOSIS — R10.30 LOWER ABDOMINAL PAIN: Primary | ICD-10-CM

## 2022-05-12 DIAGNOSIS — Z12.11 SCREENING FOR COLON CANCER: ICD-10-CM

## 2022-05-12 DIAGNOSIS — Z01.812 ENCOUNTER FOR SCREENING LABORATORY TESTING FOR COVID-19 VIRUS IN ASYMPTOMATIC PATIENT: ICD-10-CM

## 2022-05-12 DIAGNOSIS — K92.1 BLOODY STOOLS: ICD-10-CM

## 2022-05-12 DIAGNOSIS — Z11.52 ENCOUNTER FOR SCREENING LABORATORY TESTING FOR COVID-19 VIRUS IN ASYMPTOMATIC PATIENT: ICD-10-CM

## 2022-05-12 DIAGNOSIS — J45.909 ASTHMA, UNSPECIFIED ASTHMA SEVERITY, UNSPECIFIED WHETHER COMPLICATED, UNSPECIFIED WHETHER PERSISTENT: ICD-10-CM

## 2022-05-12 DIAGNOSIS — K92.1 MELENA: ICD-10-CM

## 2022-05-12 PROBLEM — R10.9 ABDOMINAL PAIN: Status: ACTIVE | Noted: 2022-05-12

## 2022-05-12 PROCEDURE — 99213 OFFICE O/P EST LOW 20 MIN: CPT | Mod: GC | Performed by: STUDENT IN AN ORGANIZED HEALTH CARE EDUCATION/TRAINING PROGRAM

## 2022-05-12 PROCEDURE — 3008F BODY MASS INDEX DOCD: CPT | Performed by: STUDENT IN AN ORGANIZED HEALTH CARE EDUCATION/TRAINING PROGRAM

## 2022-05-12 PROCEDURE — 99900024 HB NONBILLABLE HOSPITAL CLINIC SERVICE: Performed by: STUDENT IN AN ORGANIZED HEALTH CARE EDUCATION/TRAINING PROGRAM

## 2022-05-12 RX ORDER — BISACODYL 5 MG
10 TABLET, DELAYED RELEASE (ENTERIC COATED) ORAL 2 TIMES DAILY
Qty: 4 TABLET | Refills: 0 | Status: SHIPPED | OUTPATIENT
Start: 2022-05-12 | End: 2022-05-13

## 2022-05-12 RX ORDER — POLYETHYLENE GLYCOL 3350 17 G/17G
POWDER, FOR SOLUTION ORAL
Qty: 527 G | Refills: 0 | Status: SHIPPED | OUTPATIENT
Start: 2022-05-12

## 2022-05-12 ASSESSMENT — PAIN SCALES - GENERAL
PAINLEVEL: 0-NO PAIN
PAINLEVEL: 9

## 2022-05-12 NOTE — PROGRESS NOTES
[facility]  Internal Medicine Progress Note       ASSESSMENT AND PLAN     Asthma  Continue with albuterol    Hypertension  Blood pressure relatively well controlled we will continue to monitor    Abdominal pain  Unclear etiology for patient's abdominal pain  Is examination at this time is completely benign without any tenderness, CVA tenderness  He denies any dysuria, hematuria, flank pain radiating to the groin that would be concerning for kidney stone and denies any history of kidney stones  His abdominal pain is inferior and I wonder if is associated with the patient's thickening of the colon and could be related to possible colitis versus IBD however its been ongoing for quite some time without treatment  Also some concern for IBS although patient does not have diarrhea or constipation but the patient's sharp cramping sensation this may think maybe this is more functional.  Not appear to have any nausea, vomiting that would make me think gastroenteritis    -We were able to have the patient moved up for follow-up with GI today at 1 PM; suspect he will need colo to further follow up colonic thickening  -We will determine after GI evaluates to see if repeat CT scan is necessary  -We will obtain repeat general labs to further assess however have asked patient to hold off until GI evaluated see if there is anything else that they further want to add; UA ordered  -Could consider trialing antispasmodic agent such as Bentyl but will hold off at this time until GI evaluation later today.      Health care maintenance: Deferred to address acute issue, will follow up with GI for colonoscopy.    Return in about 6 months (around 11/12/2022).  At next visit: healthcare maintenance    Sergei Tolentino, DO     SUBJECTIVE     Fernando Lyle is a 47 y.o. year-old male, primary patient of Dr. Tolentino with a past medical history of asthma, questionable CKD? (1.3 but corresponds to egfr>60), morbid obesity  "who presents for follow up of medical conditions.    Interval History: Patient states that he is coming in as he has been having ongoing generalized abdominal discomfort.  He states that the discomfort has been going on for really greater than 2 years at this time.  He locates the pain to both sides of his umbilicus and in the inferior aspect of his abdomen on both sides.  He states the pain is oftentimes sporadic and will come on randomly however he states that for the most part he will most always get this sharp pain first thing in the morning whenever he eats or drinks something.  He says it does not matter what the food is whether its coffee, juice, or food whenever he eats first thing in the morning will cause this discomfort.  He denies it being associated with any particular foods such as grains or dairy.  He then states that it will come on randomly throughout the day at work and is not associated with anything in particular could be when he is sitting or when he is up and moving as he works at .  He denies any constipation or straining.  He denies any diarrhea.  He notes some intermittent episodes of blood in his stools most recently in February but denies persistent dark tarry stools or blood in his bowel movements.  He states that maybe his bowel movements are little bit thinner in caliber but otherwise moves his bowels about 2-3 times a day.  He denies any nausea, vomiting, reflux.  He previously was being seen was referred to GI as he had colonic thickening seen on the CT scan and was referred to GI however the pandemic unfortunately delayed his being able to follow-up and he now is scheduled for follow-up in June.  He denies any blood, dysuria, hematuria, flank pain or pain radiating into the groin.    PHYSICAL EXAMINATION     Visit Vitals  /75 (BP Location: Left upper arm, Patient Position: Sitting)   Pulse (!) 102   Temp 36.2 °C (97.2 °F) (Temporal)   Resp 18   Ht 1.702 m (5' 7\")   Wt " 116 kg (256 lb 12.8 oz)   SpO2 98%   BMI 40.22 kg/m²       Physical Exam  Constitutional:       Appearance: He is obese.   HENT:      Head: Normocephalic and atraumatic.      Right Ear: External ear normal.      Left Ear: External ear normal.      Nose: Nose normal.      Mouth/Throat:      Mouth: Mucous membranes are moist.   Eyes:      General: No scleral icterus.     Extraocular Movements: Extraocular movements intact.      Pupils: Pupils are equal, round, and reactive to light.   Cardiovascular:      Rate and Rhythm: Regular rhythm. Tachycardia present.      Pulses: Normal pulses.      Heart sounds: No murmur heard.  Pulmonary:      Effort: Pulmonary effort is normal.      Breath sounds: No wheezing, rhonchi or rales.   Abdominal:      General: Abdomen is flat.      Tenderness: There is no abdominal tenderness. There is no guarding or rebound.   Musculoskeletal:      Cervical back: Normal range of motion.      Right lower leg: No edema.      Left lower leg: No edema.   Skin:     General: Skin is warm.      Capillary Refill: Capillary refill takes less than 2 seconds.   Neurological:      General: No focal deficit present.      Mental Status: He is alert and oriented to person, place, and time.   Psychiatric:         Mood and Affect: Mood normal.         Behavior: Behavior normal.             LABS / IMAGING / STUDIES        Labs Reviewed: Reviewed    Studies/Imaging Reviewed: Reviewed      MEDICATIONS      Current Outpatient Medications   Medication Instructions   • albuterol HFA (VENTOLIN HFA) 90 mcg/actuation inhaler 2 puffs, inhalation, Every 4 hours PRN   • mupirocin (BACTROBAN) 2 % ointment No dose, route, or frequency recorded.   • naproxen (NAPROSYN) 500 mg, oral, 2 times daily with meals   • valACYclovir (VALTREX) 1 g, oral, 4 times daily, Take 2 tablets in the morning and 2 tablets in the evening for 1 day at the first sign of a flare

## 2022-05-12 NOTE — PATIENT INSTRUCTIONS
Thank you for presenting to the [facility] office    - Please follow up with GI today at 1pm  - Call with any questions or concerns  - Please obtain blood work as ordered  - Follow up in 6 months

## 2022-05-12 NOTE — PROGRESS NOTES
I performed a history and physical examination of the patient and discussed the management with the Resident. I reviewed the Resident's note and agree with the documented findings and plan of care, except for my comments below or within the additional notes today.  Patient presents with ongoing mid to lower abdominal pain which he gets sometimes after meals, sometimes just randomly.  It is not present today.    We were able to get him an appointment with GI clinic this afternoon rather than his waiting until June to have him get an evaluation, so we will not make any changes to his regimen or order imaging, but rather, will have GI do this.    LYNN Owusu MD

## 2022-05-12 NOTE — PATIENT INSTRUCTIONS
It was nice meeting you in the GI clinic!  Please get your blood work as ordered by your PCP.  I sent further lab work and stool studies as well. Please get these as soon as possible.  Additionally, I sent your bowel prep to your pharmacy and have attached the instructions below.  We will plan for both an Upper Endoscopy and Colonoscopy for further evaluation of your symptoms and will defer any repeat imaging at this time.  If you have any questions, feel free to call the [facility] GI clinic at [phone].    Sincerely,    Dr. Uriel Starr,    Department of Gastroenterology    You will need to have COVID-19 testing 2-5 days prior to your Colonoscopy. This will be done at [facility]'s lab. Please call [phone] today or tomorrow to get yourself scheduled ahead of time. If you do not get COVID-19 testing your colonoscopy will be canceled. Please arrive 15 minutes early, in a mask, alone, with your ID and Insurance cards.    You are having a Upper Endoscopy (EGD) Colonoscopy on 6/27/22 at 11:00 am. Please arrive 1 hour early.    The directions are as follows:  Arrange for a ride to and from the procedure.  You need someone to be with you either driving you or with you in a cab, Uber, or public transportation.    3 days before your procedure, stop eating any nuts, seeds, or popcorn.  Purchase some clear liquid that is not red.  2 days prior to your procedure stay hydrated.      The day prior to your procedure    Stop eating all solid foods.  You will start a clear liquid diet.  Examples of clear liquids or Gatorade, Pedialyte, or Powerade clear broths or bouillon.  Black coffee and tea are fine but do not use any milk or creamers.  Water ice is okay.  You can have carbonated and noncarbonated soft drinks or other fruit flavored drinks.     MiraLAX prep  Mix 238 g of polyethylene glycol 3350 AKA MiraLAX or generic brand with a 64 ounces of Gatorade, Crystal Lite, or another electrolyte drink.  This is your bowel  solution.    At 3:00 PM the day prior to the procedure, take 2 dulcolax tablets and you will drink 8 ounces of the solution every 10-15 minutes until one half of the solution is gone.  If you become nauseous, slow down or take a break.  Once half of the drink is gone, you can take a break.  At 6 PM, take 2 dulcolax tablets and you will start drinking 8 ounces every 10-15 minutes again until the solution is gone.    The goal is that your bowel movements should be light yellow to clear liquid only without any solid stool.  At midnight, do not eat or drink anything.  You will not eat or drink anything the morning of the procedure.    Stay near a toilet that day as the goal of this is to produce diarrhea and to clean out your colon so that we can get a good look with the colonoscopy.  If you have vomiting, stop drinking the preparation for 30 minutes and attempt again at a slower pace.    The day of the procedure you will arrive at the Ambulatory Procedure Center at [facility] on the first floor by the Main Entrance at least 1 hour before your procedure. (Parking Garage A)    Stop the following medications: None.  All other medications can be taken regularly.  If you need to take any of your medications the morning of the procedure, then take them with small sips of water first thing in the morning.

## 2022-05-12 NOTE — ASSESSMENT & PLAN NOTE
Unclear etiology for patient's abdominal pain  Is examination at this time is completely benign without any tenderness, CVA tenderness  He denies any dysuria, hematuria, flank pain radiating to the groin that would be concerning for kidney stone and denies any history of kidney stones  His abdominal pain is inferior and I wonder if is associated with the patient's thickening of the colon and could be related to possible colitis versus IBD however its been ongoing for quite some time without treatment  Also some concern for IBS although patient does not have diarrhea or constipation but the patient's sharp cramping sensation this may think maybe this is more functional.  Not appear to have any nausea, vomiting that would make me think gastroenteritis    -We were able to have the patient moved up for follow-up with GI today at 1 PM; suspect he will need colo to further follow up colonic thickening  -We will determine after GI evaluates to see if repeat CT scan is necessary  -We will obtain repeat general labs to further assess however have asked patient to hold off until GI evaluated see if there is anything else that they further want to add; UA ordered  -Could consider trialing antispasmodic agent such as Bentyl but will hold off at this time until GI evaluation later today.

## 2022-05-12 NOTE — PROGRESS NOTES
Gastroenterology Clinic Note       SUBJECTIVE   Mr Lyle is a 47 y.o male w/ pmh of morbid obesity and asthma who was referred to the GI clinic by his PCP for abdominal pain.    Patient states he was previously referred to a Gastroenterologist for prior concern for CT thickening in his colon back in 2016 back in [place] (although no report of this). He states he was supposed to follow up with a Gastroenterologist, but never got a chance to make an appointment. He notes another CT scan back in 2020 for a fall and nausea (found unresponsive) without any acute abnormality, however CT revealed lipomatous mural thickening of the ascending colon (felt 2/2 sequela of intervalinfection/inflammation). No other masses or lymphadenopathy.    Today, he denies any abdominal pain, nausea or vomiting and is otherwise feeling well. Notes significant, bilateral lower abdominal discomfort and intermittent sharp abdominal pain, particularly worse after meals. States he had these symptoms the other day with 10/10 pain. Denies any known triggers that make symptoms worse or exacerbate symptoms. He endorses post-prandial discomfort for several years, states back in 2016 it was mild but has continued to progress. Notes that sometimes it can come on without eating. Has not tried taking anything for relief. No nausea, vomiting, post-prandial fullness or early satiety. No epigastric pain or other radiating symptoms. Denies any weight loss or poor appetite. No dysphagia, odynophagia, reflux or heartburn symptoms or epigastric pain. Denies any abdominal bloating or abdominal fullness. Does note nocturnal symptoms at times, nothing recently but sometimes it will cause him to wake up at night. No fevers, chills, night sweats or other constitutional symptoms.    Further notes occasional bloody stools every so few months which started / coincided around the onset of symptoms back in 2016. Notes dark bloody stools with both wiping and in the toilet  bowel, mixed with bowel movement. Denies any hemorrhoids or pruritus or rectal pain.  Otherwise, no changes in bowel movements, change in stool caliber, change in stool frequency, constipation, or diarrhea. Moves his bowels generally about twice a day, once in the morning and in the evening. No relief of symptoms after a BM. Denies any dark/tarry stools. No mucoid stools.     He has never had a prior EGD or colonoscopy in the past. No blood thinners. No significant NSAID use. Otherwise, denies any new medications or changes in regards to his diet. No prior abdominal surgeries.    He was ordered routine blood work by his PCP today (5/12/22), has yet to get the blood work today.    Social Hx:  Occupation: Works at a  in [place]  EtoH: Drinks 2-3 shots of alcohol over the course of a week  Drugs: Denies illicits, only marijuana    Family Hx: Denies any family history of colon cancer, stomach cancer or other known GI malignancy. No known family history of inflammatory bowel disease.    Pertinent Prior W/u / Data Review:    CT CAP w/ IV Contrast 6/30/22- Impression: No acute posttraumatic abnormality in the chest, abdomen or pelvis.  Lipomatous mural thickening of the ascending colon has developed since 11/22/2017 and may be sequela of interval infection/inflammation.    CT Abdomen/Pelvis w/ IV Contrast 11/2/2017- Impression: No gross evidence of visceral or vascular injury within the chest, abdomen or pelvis.    EGD / Colonoscopy: No prior endoscopies    PCP: Dr Sergei Tolentino     REVIEW OF SYSTEMS   Review of Systems  12 point ROS was otherwise negative except for those stated above per HPI     MEDICATIONS        Current Outpatient Medications:   •  bisacodyL (DULCOLAX) 5 mg EC tablet, Take 2 tablets (10 mg total) by mouth 2 (two) times a day for 2 doses. Take day prior to procedure. 2 tabs at 3pm and 2 tabs at 6pm., Disp: 4 tablet, Rfl: 0  •  polyethylene glycol (MIRALAX) 17 gram/dose powder, Day 1 mix 119G  "in 32oz of gatorade and drink at 6pm, Day 2 mix 238G in 64 oz gatorade drink 8oz every 15min at 3pm, Disp: 527 g, Rfl: 0  •  albuterol HFA (VENTOLIN HFA) 90 mcg/actuation inhaler, Inhale 2 puffs every 4 (four) hours as needed for wheezing or shortness of breath., Disp: 8 g, Rfl: 5  •  mupirocin (BACTROBAN) 2 % ointment, , Disp: , Rfl:   •  naproxen 500 mg tablet, Take 1 tablet (500 mg total) by mouth 2 (two) times a day with meals., Disp: 30 tablet, Rfl: 0  •  valACYclovir 1 gram tablet, Take 1 g by mouth 4 (four) times a day. Take 2 tablets in the morning and 2 tablets in the evening for 1 day at the first sign of a flare, Disp: , Rfl:     PHYSICAL EXAMINATION   Vital signs:   Visit Vitals  /77 (BP Location: Left upper arm, Patient Position: Sitting)   Pulse (!) 111   Temp 36.6 °C (97.9 °F) (Temporal)   Resp 18   Ht 1.702 m (5' 7\")   Wt 116 kg (256 lb)   SpO2 96%   BMI 40.10 kg/m²     Physical Exam  General: Comfortable appearing, obese, middle-aged male, in no acute distress; non-toxic appearing  Eyes: No scleral icterus; pink conjunctivae  HENT: No oropharyngeal lesions; MMM  Cardiac: RRR; no JVD  Lungs: Normal work of breathing and effort on room air  Abdomen: Soft, non-tender, non-distended, protuberant; mild tenderness on palpation in bilateral lower quadrants; no guarding or rebound tenderness; no hepatosplenomegaly  Extremities: Warm and well-perfused  Skin: No jaundice or evidence of stigmata of liver disease (spider angiomata, etc)  Neuro: Grossly non-focal; moves all four extremities spontaneously  Psych: Normal mood and affect; pleasant and cooperative     LABS / IMAGING/STUDIES      Labs Reviewed:   Lab Results   Component Value Date    GLUCOSE 98 02/07/2021    CALCIUM 8.8 (L) 02/07/2021     02/07/2021    K 3.5 (L) 02/07/2021    CO2 26 02/07/2021     02/07/2021    BUN 12 02/07/2021    CREATININE 1.3 02/07/2021     Lab Results   Component Value Date    WBC 3.65 (L) 02/07/2021    HGB " 16.9 02/07/2021    HCT 51.2 (H) 02/07/2021    MCV 92.1 02/07/2021     02/07/2021     Lab Results   Component Value Date    HEPCAB <0.1 12/11/2020       Studies/Imaging Reviewed:    Pertinent Prior W/u / Data Review:    CT CAP w/ IV Contrast 6/30/22- Impression: No acute posttraumatic abnormality in the chest, abdomen or pelvis.  Lipomatous mural thickening of the ascending colon has developed since 11/22/2017 and may be sequela of interval infection/inflammation.    CT Abdomen/Pelvis w/ IV Contrast 11/2/2017- Impression: No gross evidence of visceral or vascular injury within the chest, abdomen or pelvis.    EGD / Colonoscopy: No prior endoscopies         ASSESSMENT AND PLAN   Problem List Items Addressed This Visit   Mr Lyle is a 47 y.o male w/ pmh of morbid obesity and who was referred to the GI clinic by his PCP for abdominal pain and prior CT colonic wall thickening.       Nervous    Abdominal pain - Primary  Patient endorsing bilateral lower quadrant abdominal pain for the past few years, but has progressively became worse. Particularly post-prandial discomfort but often can occur as well without eating. Further coincided with onset of bloody stools at the same time of his abdominal pain back in 2016 along with CT colonic wall thickening. Given his chronicity of symptoms, bloody stools, nocturnal symptoms, and CT evidence of colon wall thickening (2016 and in 2020) I am most concerned about underlying inflammatory bowel disease (IBD). Other etiologies could possibly be from underlying, mild bouts of diverticulitis (although less likely given blood stools) vs colitis vs. malignancy. No other symptoms concerning for IBS. Would benefit from both an EGD and Colonoscopy for further evaluation of his symptoms and will defer any repeat imaging at this time as he is currently without any pain except for mild tenderness on exam  - Plan for EGD and Colonoscopy on 6/7/22 at 11:00 AM with Dr Green  - Will try  and coordinate sooner endoscopies in June if something were to become available sooner  - Has lab work ordered by hi PCP, advised that he get his labs checked today  - Will further order CRP, ESR and fecal calprotectin given concern for possible underlying IBD  - Check FIT as well for complete evaluation  - Instructed patient to avoid NSAIDs  - May take acetaminophen as needed for symptom control, no role for anti-spasmodics or bowel regimen at this time  - Discussed strict ED precautions if his symptoms were to worsen  - Reviewed bowel prep instructions and sent bowel prep to pharmacy. Plan for two day bowel prep given morbid obesity to ensure clear stools / adequate prep at time of colonoscopy      Relevant Orders    Case Request GI: UPPER GASTROINTESTINAL ENDOSCOPY WITH BIOPSY, COLONOSCOPY DIAGNOSTIC, POSS POLYPECTOMY FOR O.R. (Completed)       Digestive    Colon wall thickening  Patient with prior CT colon wall thickening back in 2016 (no report of this). Repeat CT scan in 2020 for an ED visit for a mechanical fall, found to have lipomatous mural wall thickening in ascending colon. Concern for underlying IBD as above given chronicity of symptoms vs colitis vs large polyp vs lipoma vs malignancy. No indication for repeat imaging at this time but will try to coordinate an urgent colonoscopy  - Plan for EGD and Colonoscopy on 6/27/22, will try to coordinate sooner depending on schedule availability  - Check stool studies and inflammatory markers  - Getting other blood (CMP, CBC, A1c, TSH, etc) as ordered by his PCP  - I will follow up the rest of his blood work as well  - Strict avoidance of all NSAIDs    Relevant Orders    C-reactive protein    Sedimentation rate    Calprotectin, Fecal    Case Request GI: UPPER GASTROINTESTINAL ENDOSCOPY WITH BIOPSY, COLONOSCOPY DIAGNOSTIC, POSS POLYPECTOMY FOR O.R. (Completed)       Other    Screening for colon cancer  No prior history of colonoscopy in the past. Denies any  family history of colon or other known GI malignancy  - Plan for Diagnostic Colonoscopy for further evaluation of abdominal pain, bloody stools and CT with colon wall thickening seen on prior CT scans        Bloody stools  Notes ongoing, intermittent dark bloody stools (denies BRBPR) since onset of symptoms in 2016. No recent bloody stools, prior bleeding about one month ago. No symptoms to suggest any concern for symptomatic anemia. Does not appear hemorrhoidal in nature based on symptoms. Prior CBC 2/2021 w/ Hcb 16.9 w/ MCV 92. No recent labs at this time  - Get CBC as ordered by PCP  - Check inflammatory labs as below  - Plan for colonoscopy     Relevant Orders    Fecal Immunochemical    C-reactive protein    Sedimentation rate    Calprotectin, Fecal    Case Request GI: UPPER GASTROINTESTINAL ENDOSCOPY WITH BIOPSY, COLONOSCOPY DIAGNOSTIC, POSS POLYPECTOMY FOR O.R. (Completed)      Other Visit Diagnoses     Encounter for screening laboratory testing for COVID-19 virus in asymptomatic patient        Relevant Orders    COVID-19 PAT/Pre-procedural        RTC: In 4 months    Patient seen and examined. Plan discussed with Gastroenterology attending, Dr. Lopez.    Uriel Starr DO  Gastroenterology Fellow, PGY-IV  Pager [phone]           Uriel Starr DO  05/12/22  1:58 PM

## 2022-05-14 LAB
ALBUMIN SERPL-MCNC: 4.8 G/DL (ref 4–5)
ALBUMIN/GLOB SERPL: 1.7 {RATIO} (ref 1.2–2.2)
ALP SERPL-CCNC: 64 IU/L (ref 44–121)
ALT SERPL-CCNC: 27 IU/L (ref 0–44)
APPEARANCE UR: ABNORMAL
AST SERPL-CCNC: 25 IU/L (ref 0–40)
BACTERIA #/AREA URNS HPF: NORMAL /[HPF]
BASOPHILS # BLD AUTO: 0 X10E3/UL (ref 0–0.2)
BASOPHILS NFR BLD AUTO: 1 %
BILIRUB SERPL-MCNC: 0.4 MG/DL (ref 0–1.2)
BILIRUB UR QL STRIP: NEGATIVE
BUN SERPL-MCNC: 13 MG/DL (ref 6–24)
BUN/CREAT SERPL: 10 (ref 9–20)
CALCIUM SERPL-MCNC: 9.7 MG/DL (ref 8.7–10.2)
CASTS URNS QL MICRO: NORMAL /LPF
CHLORIDE SERPL-SCNC: 98 MMOL/L (ref 96–106)
CHOLEST SERPL-MCNC: 176 MG/DL (ref 100–199)
CO2 SERPL-SCNC: 23 MMOL/L (ref 20–29)
COLOR UR: YELLOW
CREAT SERPL-MCNC: 1.3 MG/DL (ref 0.76–1.27)
CRP SERPL-MCNC: 3 MG/L (ref 0–10)
EGFRCR SERPLBLD CKD-EPI 2021: 69 ML/MIN/1.73
EOSINOPHIL # BLD AUTO: 0.2 X10E3/UL (ref 0–0.4)
EOSINOPHIL NFR BLD AUTO: 3 %
EPI CELLS #/AREA URNS HPF: NORMAL /HPF (ref 0–10)
ERYTHROCYTE [DISTWIDTH] IN BLOOD BY AUTOMATED COUNT: 12.5 % (ref 11.6–15.4)
ERYTHROCYTE [SEDIMENTATION RATE] IN BLOOD BY WESTERGREN METHOD: 2 MM/HR (ref 0–15)
GLOBULIN SER CALC-MCNC: 2.9 G/DL (ref 1.5–4.5)
GLUCOSE SERPL-MCNC: 89 MG/DL (ref 65–99)
GLUCOSE UR QL STRIP: NEGATIVE
HBA1C MFR BLD: 5.7 % (ref 4.8–5.6)
HCT VFR BLD AUTO: 47.3 % (ref 37.5–51)
HDLC SERPL-MCNC: 46 MG/DL
HGB BLD-MCNC: 16.4 G/DL (ref 13–17.7)
HGB UR QL STRIP: NEGATIVE
IMM GRANULOCYTES # BLD AUTO: 0.1 X10E3/UL (ref 0–0.1)
IMM GRANULOCYTES NFR BLD AUTO: 1 %
KETONES UR QL STRIP: ABNORMAL
LAB CORP URINALYSIS REFLEX: ABNORMAL
LDLC SERPL CALC-MCNC: 115 MG/DL (ref 0–99)
LEUKOCYTE ESTERASE UR QL STRIP: NEGATIVE
LYMPHOCYTES # BLD AUTO: 2.3 X10E3/UL (ref 0.7–3.1)
LYMPHOCYTES NFR BLD AUTO: 36 %
MCH RBC QN AUTO: 31.2 PG (ref 26.6–33)
MCHC RBC AUTO-ENTMCNC: 34.7 G/DL (ref 31.5–35.7)
MCV RBC AUTO: 90 FL (ref 79–97)
MICRO URNS: ABNORMAL
MICRO URNS: ABNORMAL
MONOCYTES # BLD AUTO: 0.5 X10E3/UL (ref 0.1–0.9)
MONOCYTES NFR BLD AUTO: 8 %
NEUTROPHILS # BLD AUTO: 3.3 X10E3/UL (ref 1.4–7)
NEUTROPHILS NFR BLD AUTO: 51 %
NITRITE UR QL STRIP: NEGATIVE
PH UR STRIP: 6 [PH] (ref 5–7.5)
PLATELET # BLD AUTO: 296 X10E3/UL (ref 150–450)
POTASSIUM SERPL-SCNC: 4.1 MMOL/L (ref 3.5–5.2)
PROT SERPL-MCNC: 7.7 G/DL (ref 6–8.5)
PROT UR QL STRIP: ABNORMAL
RBC # BLD AUTO: 5.26 X10E6/UL (ref 4.14–5.8)
RBC #/AREA URNS HPF: NORMAL /HPF (ref 0–2)
SODIUM SERPL-SCNC: 139 MMOL/L (ref 134–144)
SP GR UR STRIP: >=1.03 (ref 1–1.03)
T4 FREE SERPL-MCNC: 1.4 NG/DL (ref 0.82–1.77)
TRIGL SERPL-MCNC: 80 MG/DL (ref 0–149)
TSH SERPL DL<=0.005 MIU/L-ACNC: 1.32 UIU/ML (ref 0.45–4.5)
UROBILINOGEN UR STRIP-MCNC: 0.2 MG/DL (ref 0.2–1)
VLDLC SERPL CALC-MCNC: 15 MG/DL (ref 5–40)
WBC # BLD AUTO: 6.3 X10E3/UL (ref 3.4–10.8)
WBC #/AREA URNS HPF: NORMAL /HPF (ref 0–5)

## 2022-05-18 ENCOUNTER — TELEPHONE (OUTPATIENT)
Dept: INTERNAL MEDICINE | Facility: HOSPITAL | Age: 47
End: 2022-05-18
Payer: COMMERCIAL

## 2022-05-18 ENCOUNTER — HOSPITAL ENCOUNTER (OUTPATIENT)
Facility: HOSPITAL | Age: 47
Discharge: HOME | End: 2022-05-18
Attending: INTERNAL MEDICINE | Admitting: INTERNAL MEDICINE
Payer: COMMERCIAL

## 2022-05-18 PROBLEM — K62.5 RECTAL BLEEDING: Status: ACTIVE | Noted: 2022-05-18

## 2022-05-19 ENCOUNTER — TELEPHONE (OUTPATIENT)
Dept: INTERNAL MEDICINE | Facility: HOSPITAL | Age: 47
End: 2022-05-19
Payer: COMMERCIAL

## 2022-05-19 NOTE — TELEPHONE ENCOUNTER
Called patient and informed of blood work.  Asked him to try to make attempts for lifestyle modification for his cholesterol and prediabetes level A1c.  Discussed diet and exercise modifications.  Patient appreciative of the call and answered all questions.  Patient will continue to have ongoing follow-up with GI.

## 2022-06-02 ENCOUNTER — DOCUMENTATION (OUTPATIENT)
Dept: INTERNAL MEDICINE | Facility: HOSPITAL | Age: 47
End: 2022-06-02
Payer: COMMERCIAL

## 2022-06-02 NOTE — PROGRESS NOTES
Garret Nixon MA has completed a chart review for Fernando Lyle and have determined that the care gap has been satisfied.    Care Gap Source: [facility]    Care Gap(s) Identified: Colorectal Cancer Screening    Chart Review Completed: Yes       Pt has an upcoming GI appt, no outreach needed at this time.

## 2022-06-20 ENCOUNTER — TELEPHONE (OUTPATIENT)
Dept: INTERNAL MEDICINE | Facility: HOSPITAL | Age: 47
End: 2022-06-20
Payer: COMMERCIAL

## 2022-06-20 NOTE — TELEPHONE ENCOUNTER
Pt called requesting amoxicillin due to a rash he is experiencing. Pt informed that he may need to be seen in office before a antibiotic can be prescribed.

## 2022-06-20 NOTE — TELEPHONE ENCOUNTER
"Spoke with patient. He stated that his wife was sick and on a antibiotic. He wanted to be prescribed an antibiotic \"to be safe\". I advised that we would not prescribe an antibiotic for that reason. Educated patient on antibiotic over uses and resistance.  "

## 2022-06-27 ENCOUNTER — HOSPITAL ENCOUNTER (OUTPATIENT)
Facility: HOSPITAL | Age: 47
End: 2022-06-27
Attending: INTERNAL MEDICINE | Admitting: INTERNAL MEDICINE
Payer: COMMERCIAL

## 2022-06-27 ENCOUNTER — ANESTHESIA EVENT (OUTPATIENT)
Dept: ENDOSCOPY | Facility: HOSPITAL | Age: 47
End: 2022-06-27
Payer: COMMERCIAL

## 2022-06-27 ENCOUNTER — APPOINTMENT (OUTPATIENT)
Dept: LAB | Facility: HOSPITAL | Age: 47
End: 2022-06-27
Attending: STUDENT IN AN ORGANIZED HEALTH CARE EDUCATION/TRAINING PROGRAM
Payer: COMMERCIAL

## 2022-06-27 ENCOUNTER — ANESTHESIA (OUTPATIENT)
Dept: ENDOSCOPY | Facility: HOSPITAL | Age: 47
End: 2022-06-27
Payer: COMMERCIAL

## 2022-06-27 DIAGNOSIS — Z11.52 ENCOUNTER FOR PREOPERATIVE SCREENING LABORATORY TESTING FOR COVID-19 VIRUS: ICD-10-CM

## 2022-06-27 DIAGNOSIS — Z01.812 ENCOUNTER FOR PREOPERATIVE SCREENING LABORATORY TESTING FOR COVID-19 VIRUS: ICD-10-CM

## 2022-06-27 DIAGNOSIS — K63.9 COLON WALL THICKENING: ICD-10-CM

## 2022-06-27 DIAGNOSIS — K92.1 BLOODY STOOLS: Primary | ICD-10-CM

## 2022-06-27 LAB — SARS-COV-2 RNA RESP QL NAA+PROBE: NEGATIVE

## 2022-06-27 PROCEDURE — C9803 HOPD COVID-19 SPEC COLLECT: HCPCS

## 2022-06-27 PROCEDURE — U0003 INFECTIOUS AGENT DETECTION BY NUCLEIC ACID (DNA OR RNA); SEVERE ACUTE RESPIRATORY SYNDROME CORONAVIRUS 2 (SARS-COV-2) (CORONAVIRUS DISEASE [COVID-19]), AMPLIFIED PROBE TECHNIQUE, MAKING USE OF HIGH THROUGHPUT TECHNOLOGIES AS DESCRIBED BY CMS-2020-01-R: HCPCS

## 2022-06-27 PROCEDURE — 75000060 HC EGD BIOPSY: Performed by: INTERNAL MEDICINE

## 2022-06-27 PROCEDURE — 75000014 HC COLONSCOPY DIAGNOSTIC: Performed by: INTERNAL MEDICINE

## 2022-06-27 RX ORDER — POLYETHYLENE GLYCOL 3350 17 G/17G
POWDER, FOR SOLUTION ORAL
Qty: 238 G | Refills: 0 | Status: SHIPPED | OUTPATIENT
Start: 2022-06-27

## 2022-06-27 RX ORDER — OXYCODONE HYDROCHLORIDE 5 MG/1
5 TABLET ORAL ONCE AS NEEDED
Status: CANCELLED | OUTPATIENT
Start: 2022-06-27

## 2022-06-27 RX ORDER — ACETAMINOPHEN 325 MG/1
650 TABLET ORAL ONCE AS NEEDED
Status: CANCELLED | OUTPATIENT
Start: 2022-06-27

## 2022-06-27 RX ORDER — DEXTROSE 50 % IN WATER (D50W) INTRAVENOUS SYRINGE
25 AS NEEDED
Status: CANCELLED | OUTPATIENT
Start: 2022-06-27

## 2022-06-27 RX ORDER — FENTANYL CITRATE 50 UG/ML
50 INJECTION, SOLUTION INTRAMUSCULAR; INTRAVENOUS
Status: CANCELLED | OUTPATIENT
Start: 2022-06-27

## 2022-06-27 RX ORDER — DEXTROSE 40 %
15-30 GEL (GRAM) ORAL AS NEEDED
Status: CANCELLED | OUTPATIENT
Start: 2022-06-27

## 2022-06-27 RX ORDER — ONDANSETRON HYDROCHLORIDE 2 MG/ML
4 INJECTION, SOLUTION INTRAVENOUS
Status: CANCELLED | OUTPATIENT
Start: 2022-06-27

## 2022-06-27 RX ORDER — IBUPROFEN 200 MG
16-32 TABLET ORAL AS NEEDED
Status: CANCELLED | OUTPATIENT
Start: 2022-06-27

## 2022-06-27 RX ORDER — BISACODYL 5 MG
10 TABLET, DELAYED RELEASE (ENTERIC COATED) ORAL 2 TIMES DAILY
Qty: 4 TABLET | Refills: 0 | Status: SHIPPED | OUTPATIENT
Start: 2022-06-27 | End: 2022-06-28

## 2022-06-27 NOTE — PROGRESS NOTES
Brief GI Note:    Patient prepped for colonoscopy today, however did not obtain COVID-19 testing. Will send to lab for rapid COVID-19 testing and will plan to do procedures tomorrow, 6/28. Will need additional bowel prep, sent to pharmacy.    Discussed with patient. Demonstrated understanding and amenable to plan.    Uriel Starr DO  Gastroenterology Fellow, PGY-IV  Pager [phone]

## 2022-06-27 NOTE — ANESTHESIA PREPROCEDURE EVALUATION
Relevant Problems   CARDIOVASCULAR   (+) Hypertension      GASTROINTESTINAL   (+) Rectal bleeding      RESPIRATORY SYSTEM   (+) Asthma      Other   (+) Viral upper respiratory tract infection       47 y.o male w/ pmh of morbid obesity and asthma      EKG 2020  Normal sinus rhythm   When compared with ECG of 30-JUN-2020 16:01, (unconfirmed)   Vent. rate has decreased BY  44 BPM   T wave inversion now evident in Inferior leads   Confirmed by ALYSSA LIGHT MD ([id number]) on 7/1/2020 3:24:03 PM        Anesthesia ROS/MED HX    Anesthesia History - neg  Pulmonary    asthma  Neuro/Psych - neg  Cardiovascular   hypertension   Covid19 Test Reviewed and ECG reviewed  Hematological - neg  GI/Hepatic- neg  Musculoskeletal- neg  Renal Disease- neg  Endo/Other- neg       No past surgical history on file.    Physical Exam    Airway   Mallampati: II   TM distance: >3 FB   Neck ROM: full  Cardiovascular - normal   Rhythm: regular   Rate: normalPulmonary - normal   clear to auscultation  Dental - normal        Anesthesia Plan    Plan: general    Technique: total IV anesthesia     Lines and Monitors: PIV     Airway: natural airway / supplemental oxygen   ASA 2  Blood Products:     Use of Blood Products Discussed: Yes     Consented to blood products  Anesthetic plan and risks discussed with: patient  Induction:    intravenous   Postop Plan:   Patient Disposition: phase II then home   Pain Management: IV analgesics

## 2022-06-27 NOTE — ANESTHESIOLOGIST PRE-PROCEDURE ATTESTATION
Pre-Procedure Patient Identification:  I am the Primary Anesthesiologist and have identified the patient on 06/27/22 at 9:42 AM.   I have confirmed the procedure(s) will be performed by the following surgeon/proceduralist Elvis Green MD.

## 2022-06-28 ENCOUNTER — TELEPHONE (OUTPATIENT)
Dept: GASTROENTEROLOGY | Facility: HOSPITAL | Age: 47
End: 2022-06-28
Payer: COMMERCIAL

## 2022-06-28 RX ORDER — ONDANSETRON HYDROCHLORIDE 2 MG/ML
4 INJECTION, SOLUTION INTRAVENOUS
Status: CANCELLED | OUTPATIENT
Start: 2022-06-28

## 2022-06-28 RX ORDER — DEXTROSE 50 % IN WATER (D50W) INTRAVENOUS SYRINGE
25 AS NEEDED
Status: CANCELLED | OUTPATIENT
Start: 2022-06-28

## 2022-06-28 RX ORDER — SODIUM CHLORIDE, SODIUM GLUCONATE, SODIUM ACETATE, POTASSIUM CHLORIDE AND MAGNESIUM CHLORIDE 30; 37; 368; 526; 502 MG/100ML; MG/100ML; MG/100ML; MG/100ML; MG/100ML
125 INJECTION, SOLUTION INTRAVENOUS CONTINUOUS
Status: CANCELLED | OUTPATIENT
Start: 2022-06-28 | End: 2022-07-05

## 2022-06-28 RX ORDER — DEXTROSE 40 %
15-30 GEL (GRAM) ORAL AS NEEDED
Status: CANCELLED | OUTPATIENT
Start: 2022-06-28

## 2022-06-28 RX ORDER — IBUPROFEN 200 MG
16-32 TABLET ORAL AS NEEDED
Status: CANCELLED | OUTPATIENT
Start: 2022-06-28

## 2022-06-28 NOTE — PROGRESS NOTES
Brief GI Note:    Patient was a NO SHOW.     Called patient three separate times this morning, left VM around 10:50 AM. Advised to call back the [facility] clinic in regards to rescheduling his procedures.      Uriel Starr DO  Gastroenterology Fellow, PGY-IV  Pager [phone]

## 2022-06-28 NOTE — TELEPHONE ENCOUNTER
Telephone Call:    Called patient three separate times this morning as patient had his EGD and colonoscopy rescheduled for today (previously rescheduled as patient did not obtain his pre-procedural COVID-19 test the day prior) but did not show up. Interestingly, he obtained his pre-procedural COVID-19 test (which was negative) for his procedures today.    Left VM around 10:50 AM on 6/28/2022 informing that his EGD and Colonoscopy have been cancelled (previously scheduled at 10:00 AM this morning and was instructed to show up at 9AM).     Instructed patient to call back the [facility] GI clinic at [phone] in regards to rescheduling procedures.      Uriel Starr DO  Gastroenterology Fellow, PGY-IV  Pager [phone]

## 2022-09-01 ENCOUNTER — TELEPHONE (OUTPATIENT)
Dept: GASTROENTEROLOGY | Facility: HOSPITAL | Age: 47
End: 2022-09-01
Payer: COMMERCIAL

## 2022-09-01 DIAGNOSIS — Z01.812 ENCOUNTER FOR PREOPERATIVE SCREENING LABORATORY TESTING FOR COVID-19 VIRUS: ICD-10-CM

## 2022-09-01 DIAGNOSIS — Z11.52 ENCOUNTER FOR PREOPERATIVE SCREENING LABORATORY TESTING FOR COVID-19 VIRUS: ICD-10-CM

## 2022-09-01 DIAGNOSIS — R10.30 LOWER ABDOMINAL PAIN: Primary | ICD-10-CM

## 2022-09-01 DIAGNOSIS — K63.9 COLON WALL THICKENING: ICD-10-CM

## 2022-09-01 DIAGNOSIS — K92.1 BLOODY STOOLS: ICD-10-CM

## 2022-09-01 RX ORDER — BISACODYL 5 MG
10 TABLET, DELAYED RELEASE (ENTERIC COATED) ORAL 2 TIMES DAILY
Qty: 4 TABLET | Refills: 0 | Status: SHIPPED | OUTPATIENT
Start: 2022-09-01 | End: 2022-09-02

## 2022-09-01 NOTE — TELEPHONE ENCOUNTER
Telephone Call:    Called patient today on 9/1/22 as there was confusion in regards to rescheduling procedure. He was previously scheduled an EGD and colonoscopy back on 6/28/22 and had a COVID-19 test a few days earlier in preparation for the procedure. Unfortunately, he had a death in the family and subsequently could no longer make the procedures.    Was planning on coming back to the GI clinic today, however I called him today to reschedule the procedures over the phone as he was already seen in the office back in May 2022.    Rescheduled EGD and Colonoscopy on 11/9/22 at 10:00 AM with myself and Dr. Moreno for further evaluation of abdominal pain, dark bloody stools and prior CT with colon wall thinking.     Doing better, but still advised to get rest of blood work (specifically CRP) and stool studies as previously recommended. See last GI clinic visit back on 5/12/22 for more details.    Bowel prep sent to patient's pharmacy and pre-procedural COVID-19 test re-ordered.    Patient demonstrated understanding and appreciative of the call. Amenable to the plan and will call back if he has any other questions or concerns.    Uriel Starr, DO  Gastroenterology Fellow, PGY-V  Pager [phone]

## 2022-09-09 ENCOUNTER — HOSPITAL ENCOUNTER (OUTPATIENT)
Facility: HOSPITAL | Age: 47
End: 2022-09-09
Attending: INTERNAL MEDICINE | Admitting: INTERNAL MEDICINE
Payer: COMMERCIAL

## 2022-11-07 ENCOUNTER — TELEPHONE (OUTPATIENT)
Dept: GASTROENTEROLOGY | Facility: HOSPITAL | Age: 47
End: 2022-11-07
Payer: COMMERCIAL

## 2022-11-07 NOTE — TELEPHONE ENCOUNTER
Telephone Call:    Called patient around 4:00 PM on 11/7/2022. Unable to reach patient and left VM. No recent COVID-19 test, has procedure on 11/9/22. Left VM regarding instructions (as previously provided back on 9/1/20220) regarding COVID-19 testing prior to colonoscopy on  11/9/22.    Advised to call back the [facility].    Uriel Starr DO  Gastroenterology Fellow, PGY-V  Pager [phone]

## 2022-11-08 ENCOUNTER — ANESTHESIA EVENT (OUTPATIENT)
Dept: ENDOSCOPY | Facility: HOSPITAL | Age: 47
End: 2022-11-08

## 2022-11-09 ENCOUNTER — ANESTHESIA (OUTPATIENT)
Dept: ENDOSCOPY | Facility: HOSPITAL | Age: 47
End: 2022-11-09

## 2022-11-09 RX ORDER — SODIUM CHLORIDE, SODIUM GLUCONATE, SODIUM ACETATE, POTASSIUM CHLORIDE AND MAGNESIUM CHLORIDE 30; 37; 368; 526; 502 MG/100ML; MG/100ML; MG/100ML; MG/100ML; MG/100ML
125 INJECTION, SOLUTION INTRAVENOUS CONTINUOUS
Status: CANCELLED | OUTPATIENT
Start: 2022-11-09 | End: 2022-11-16

## 2022-11-09 RX ORDER — IBUPROFEN 200 MG
16-32 TABLET ORAL AS NEEDED
Status: CANCELLED | OUTPATIENT
Start: 2022-11-09

## 2022-11-09 RX ORDER — FENTANYL CITRATE 50 UG/ML
25 INJECTION, SOLUTION INTRAMUSCULAR; INTRAVENOUS EVERY 5 MIN PRN
Status: CANCELLED | OUTPATIENT
Start: 2022-11-09

## 2022-11-09 RX ORDER — DEXTROSE 50 % IN WATER (D50W) INTRAVENOUS SYRINGE
25 AS NEEDED
Status: CANCELLED | OUTPATIENT
Start: 2022-11-09

## 2022-11-09 RX ORDER — DEXTROSE 40 %
15-30 GEL (GRAM) ORAL AS NEEDED
Status: CANCELLED | OUTPATIENT
Start: 2022-11-09

## 2022-11-09 RX ORDER — ONDANSETRON HYDROCHLORIDE 2 MG/ML
4 INJECTION, SOLUTION INTRAVENOUS
Status: CANCELLED | OUTPATIENT
Start: 2022-11-09

## 2022-11-09 NOTE — ANESTHESIA PREPROCEDURE EVALUATION
Relevant Problems   CARDIOVASCULAR   (+) Hypertension      GASTROINTESTINAL   (+) Rectal bleeding      RESPIRATORY SYSTEM   (+) Asthma      Other   (+) Viral upper respiratory tract infection     47 y.o male w/ pmh of morbid obesity, prediabetes, and asthma with abdominal pain.    Patient states he was previously referred to a Gastroenterologist for prior concern for CT thickening in his colon back in 2016 back in [place] (although no report of this). He states he was supposed to follow up with a Gastroenterologist, but never got a chance to make an appointment. He notes another CT scan back in 2020 for a fall and nausea (found unresponsive) without any acute abnormality, however CT revealed lipomatous mural thickening of the ascending colon (felt 2/2 sequela of intervalinfection/inflammation). No other masses or lymphadenopathy.    Anesthesia ROS/MED HX      Pulmonary    asthma  Cardiovascular   hypertension   Covid19 Test Reviewed and ECG reviewed  GI/Hepatic   GI Bleeding (Rectal bleeding)  Endo/Other  Body Habitus: Morbidly Obese  ROS/MED HX Comments:    ECG: EKG (6/30/2020):  Normal sinus rhythm   When compared with ECG of 30-JUN-2020 16:01, (unconfirmed)   Vent. rate has decreased BY  44 BPM   T wave inversion now evident in Inferior leads    GI/Hepatic/Renal: CT Chest/Abdomen/Pelvis (6/2020):  1. No acute posttraumatic abnormality in the chest, abdomen or pelvis.  2.  Lipomatous mural thickening of the ascending colon has developed since 11/22/2017 and may be sequela of interval infection/inflammation.       No past surgical history on file.    Physical Exam    CBC Results       05/12/22 02/07/21 12/10/20     1005 1256 1617    WBC 6.3 3.65 7.77    RBC 5.26 5.56 5.05    HGB 16.4 16.9 15.5    HCT 47.3 51.2 47.7    MCV 90 92.1 94.5    MCH 31.2 30.4 30.7    MCHC 34.7 33.0 32.5     212 278        CMP Results       05/12/22 02/07/21 12/10/20     1005 1256 1617     137 137    K 4.1 3.5 3.7    Cl 98 101 101     CO2 23 26 24    Glucose 89 98 90    BUN 13 12 17    Creatinine 1.30 1.3 1.2    Calcium -- 8.8 8.9    Anion Gap -- 10 12    AST 25 35 41    ALT 27 37 44    Albumin 4.8 4.7 4.6    EGFR 69 >60.0 >60.0         Comment for K at 1256 on 02/07/21: Results obtained on plasma. Plasma Potassium values may be up to 0.4 mEQ/L less than serum values. The differences may be greater for patients with high platelet or white cell counts.    Comment for K at 1617 on 12/10/20: Results obtained on plasma. Plasma Potassium values may be up to 0.4 mEQ/L less than serum values. The differences may be greater for patients with high platelet or white cell counts.  SLIGHT HEMOLYSIS, RESULT MAY BE INCREASED.    Comment for AST at 1617 on 12/10/20: SLIGHT HEMOLYSIS, RESULT MAY BE INCREASED.    Comment for ALT at 1617 on 12/10/20: SLIGHT HEMOLYSIS, RESULT MAY BE INCREASED.          Anesthesia Plan    Plan: MAC    Technique: MAC and total IV anesthesia     Lines and Monitors: PIV     Airway: natural airway / supplemental oxygen   ASA 3  Induction:    intravenous   Postop Plan:   Patient Disposition: phase II then home   Pain Management: IV analgesics

## 2024-01-30 ENCOUNTER — TELEPHONE (OUTPATIENT)
Dept: INTERNAL MEDICINE | Facility: HOSPITAL | Age: 49
End: 2024-01-30
Payer: COMMERCIAL

## 2024-01-30 NOTE — TELEPHONE ENCOUNTER
Patient missed his appointment that was scheduled for 1/30/24. I called  patient and left a voicemail to call the office back to reschedule his missed appointment. If patient calls back please assist patient with rescheduling. Thank you in advance.

## 2024-09-12 NOTE — ASSESSMENT & PLAN NOTE
Unclear trigger, story concerning for seizure, less likely cardiogenic syncope.    Monitor on tele  EEG w/o epileptiform discharges, neurology suspects syncope rather than seizure in setting of hot weather/poor PO/MDMA/MJ  --no further events  --counselled on avoiding illicit drugs.     [FreeTextEntry1] : 1. HTN   BP controlled, Continue lisinopril 20 mg po qd  limit sodium intake 2 gr daily  try to get 150 min of exercise weekly  CMP drawn in office  2. DM type 2  Has been uncontrolled, Hbg A1c drawn  follow strict ADA diet  Continue metformin  m g po BID, Jardiance 25 mg po qd, Glipizide 5 mg po qd   3. h/o elevated creatinine  check kidney function

## 2025-05-06 ENCOUNTER — HOSPITAL ENCOUNTER (EMERGENCY)
Facility: HOSPITAL | Age: 50
Discharge: LEFT BEFORE DISCHARGE | End: 2025-05-06
Payer: COMMERCIAL